# Patient Record
Sex: FEMALE | Race: WHITE | Employment: FULL TIME | URBAN - METROPOLITAN AREA
[De-identification: names, ages, dates, MRNs, and addresses within clinical notes are randomized per-mention and may not be internally consistent; named-entity substitution may affect disease eponyms.]

---

## 2024-11-14 ENCOUNTER — TELEPHONE (OUTPATIENT)
Dept: HEMATOLOGY/ONCOLOGY | Facility: HOSPITAL | Age: 39
End: 2024-11-14

## 2024-11-14 NOTE — TELEPHONE ENCOUNTER
Received phone call from patient saying that her name is now Negra Sweeney, this is same , address and phone number.  Will update chart.    Stated that she was recently diagnosed with DCIS at [facility] and would like to schedule with a breast surgeon at [place].  Provided both doctor names.  Will need breast imaging and pathology report before able to schedule.  Will request this from [facility].    Briefly discussed different types of surgery and options with plastic surgery, RT, medical oncology if needed. Stated her case will be discussed in our multi disciplinary conference once she is scheduled. Will have  call patient to update chart, and will call her back when records are received.  Patient has direct number and will call navigators back with any further questions.

## 2024-11-18 ENCOUNTER — TELEPHONE (OUTPATIENT)
Dept: HEMATOLOGY/ONCOLOGY | Facility: HOSPITAL | Age: 39
End: 2024-11-18

## 2024-11-18 NOTE — TELEPHONE ENCOUNTER
Received images and pathology report from [facility]. Scheduled with patient on Wednesday, 11/20 at 12pm.  Confirmed date, time and location. Patient will set up her MyChart account.  Will follow up with patient after her consultation.

## 2024-11-18 NOTE — CONSULTS
Breast Surgery New Patient Consultation    Negra Sweeney is a 39 year old patient, referred by Dr. Mishra, who presents for evaluation of R breast DCIS.    History of Present Illness:   Ms. Negra Sweeney is a 39 year old woman  who presents for evaluation of R breast DCIS.     She underwent screening imaging that showed R breast calcifications at the 1:00 position. She underwent ultrasound guided biopsy (clip was placed) on 2024. Pathology showed DCIS, grade 3. This was performed at an outside hospital.     She denies any palpable masses, nipple discharge, skin changes, or axillary adenopathy. She does not have breast pain. She does not have significant past history for breast diseases or breast biopsy. She does have family history of breast cancer. Her maternal grandmother possibly had breast cancer. Her maternal aunt had breast cancer in her 60s. The patient does not have a history of genetic testing.          History reviewed. No pertinent past medical history.    History reviewed. No pertinent surgical history.    Gynecological History:  Menarche at age 12 and LMP 23  Pt is a   Pt was 24 years old at time of first pregnancy.    She has cumulative breastfeeding history of 10 months.  Age of Menopause: n/a  Type: n/a  She denies any history of hormone replacement therapy  She has history of oral contraceptive use for 20 years, currently taking.   She denies infertility treatment to achieve pregnancy.    Medications:     Cetirizine HCl (ZYRTEC ALLERGY) 10 MG Oral Cap Take 10 mg by mouth daily.      Norethindrone Acet-Ethinyl Est (JENNI ) 1-20 MG-MCG Oral Tab Take 1 tablet by mouth daily.         Allergies:    Allergies[1]    Family History:   History reviewed. No pertinent family history.    She is not of Ashkenazi Religious ancestry.    Social History:  History   Alcohol use Not on file       History   Smoking Status    Not on file   Smokeless Tobacco    Not on file       Review of  Systems:    Review of Systems   Constitutional:  Negative for activity change, appetite change, chills, fatigue and unexpected weight change.   HENT:  Negative for ear pain, hearing loss, nosebleeds, sore throat, trouble swallowing and voice change.    Eyes:  Negative for pain and visual disturbance.   Respiratory:  Negative for cough, chest tightness and shortness of breath.    Cardiovascular:  Negative for chest pain, palpitations and leg swelling.   Gastrointestinal:  Negative for nausea, vomiting, abdominal pain, diarrhea, constipation and blood in stool.   Endocrine: Negative for cold intolerance and heat intolerance.   Genitourinary:  Negative for dysuria, hematuria and difficulty urinating.   Musculoskeletal:  Negative for back pain, joint swelling, joint pain and neck pain.   Skin:  Negative for color change, rash and wound.   Allergic/Immunologic: Negative for environmental allergies.   Neurological:  Negative for tremors, syncope, facial asymmetry, speech difficulty and weakness.   Hematological:  Negative for adenopathy. Does not bruise/bleed easily.   Psychiatric/Behavioral:  Negative for hallucinations, behavioral problems, confusion, agitation and depressed mood.       Otherwise as per HPI.    Physical Exam:    /75   Pulse 75   Temp 98.1 °F (36.7 °C) (Oral)   Resp 16   Wt 73 kg (161 lb)   SpO2 98%     Physical Exam  Vitals reviewed.   Constitutional:       Appearance: Normal appearance.   HENT:      Head: Normocephalic and atraumatic.   Eyes:      Extraocular Movements: Extraocular movements intact.      Pupils: Pupils are equal, round, and reactive to light.   Cardiovascular:      Rate and Rhythm: Normal rate.   Pulmonary:      Effort: Pulmonary effort is normal.   Chest:   Breasts:     Right: Normal. No mass, nipple discharge, skin change or tenderness.      Left: Normal. No mass, nipple discharge, skin change or tenderness.       Abdominal:      General: Abdomen is flat.      Palpations:  Abdomen is soft.   Musculoskeletal:         General: Normal range of motion.      Cervical back: Normal range of motion and neck supple.   Lymphadenopathy:      Upper Body:      Right upper body: No supraclavicular or axillary adenopathy.      Left upper body: No supraclavicular or axillary adenopathy.   Skin:     General: Skin is warm and dry.   Neurological:      General: No focal deficit present.      Mental Status: She is alert and oriented to person, place, and time.   Psychiatric:         Mood and Affect: Mood normal.          Bra size: 34D (L)    Labs/imaging: reviewed in EPIC    Impression:   Ms. Negra Sweeney is a 39 year old woman that presents for right breast DCIS (ER/NC+), stage 0.      Discussion and Plan:  I had a discussion with the Patient and her  regarding her breast exam. On exam today I found no evidence of disease besides biopsy site changes. I personally reviewed her recent imaging and pathology and we discussed this.    Before scheduling surgery, we need some more information. In discussion with radiology, I recommend a breast MRI for surgical planning and to evaluate the contralateral breast.  She states genetic testing results would make an impact on whether she would like to have a mastectomy or bilateral mastectomy versus lumpectomy. We will await these results before scheduling.     We discussed treatment of her stage 0 DCIS breast cancer. I explained that this cancer is non-invasive as far as we know at this time. We discussed that lumpectomy with radiation has equal survival to mastectomy.  We discussed that both procedures are outpatient, but lumpectomy has a shorter recovery time. Mastectomy involves drain placement and any reconstruction can lead to more surgical procedures down the line for symmetry/cosmetics.    We discussed that lumpectomy would require radiation that would occur daily for several weeks. We discussed the main side effects being skin reaction and  fatigue, without hair loss, nausea, or vomiting. We discussed that radiation is pretty well tolerated and would start after her surgical healing is underway.     We discussed that mastectomy would not involve radiation. Mastectomy would involve sampling of her underarm lymph nodes (sentinel lymph node biopsy). Mastectomy could be performed with or without reconstruction. If she elected for reconstruction, she would meet with a plastic surgeon for consultation.     We discussed that she would likely not require any chemotherapy, but would be offered systemic anti-estrogen therapy after surgery since her tumor is ER/PA+. We discussed that the final pathology would guide our final treatment plan and that sometimes an invasive focus can be found within DCIS. We discussed the importance of negative margins with lumpectomy and that there is a chance we would have to return to the operating room if not all the DCIS was removed. The risks, benefits, and alternatives were discussed including, but not limited to, seroma development, infection, and bleeding. We also discussed the possibility for upstaging of pathology which would require further surgery on another day.    At this time, the patient would like to proceed with MRI and genetics and then make a decision.     If the patient proceeds with lumpectomy, we discussed that I would require an additional marker (TATYANA ) placement prior to surgery in order to detect the exact location of the tumor.       Additional work up needed:   Genetics: yes, meeting with counselor today (11/20)  MRI: ordered    Surgery:   A) Lymph nodes: sentinel lymph node biopsy if mastectomy  B) Breast: TBD  C) Reconstruction by plastic surgery: TBD    Systemic therapy:  Chemotherapy: To be determined after final pathology  Anti-estrogen therapy: yes    Radiation:   If lumpectomy    She was given ample opportunity for questions and those questions were answered to her satisfaction. She has been  encouraged to contact the office with any questions or concerns. This encounter lasted a total of 55 minutes, more than 50% of which was dedicated to the discussion of management options.     Eron Simmons MD  Breast Surgical Oncology    CC: Dr. Ayesha Mishra            [1] Not on File

## 2024-11-20 NOTE — PROGRESS NOTES
Referring Provider:  Eron Simmons MD    Reason for Referral:  Negra Sweeney was referred for genetic counseling because of a new diagnosis of breast cancer. Ms. Sweeney is a 39 year-old woman of mixed  descent who was diagnosed with ER/ND-positive DCIS of the right breast on 24 following her first screening mammogram. Ms. Sweeney's ovaries are intact. Ms. Sweeney has not had a colonoscopy.    Social History:  Ms. Sweeney was seen today with her , Chase. They live in [place].     Family History:   A three generation pedigree was obtained.      Ms. Sweeney has a 14 year-old daughter.     Ms. Sweeney has one older brother and no sisters.      Ms. Sweeney's mother is 67 years-old and has not had cancer.  Ms. Sweeney's mother had one brother and three sisters. One of Ms. Sweeney's maternal aunts  in her mid-60s from breast cancer. Another maternal aunt  at an unspecified age from bone cancer. Ms. Sweeney's maternal grandmother had breast cancer in her 70s. Ms. Sweeney's maternal grandfather  an at unspecified younger age and had lung cancer.     Ms. Sweeney's father is 67 years-old and has had skin cancer, not otherwise specified. Ms. Sweeney's father has one younger brother and one younger sister. Ms. Sweeney's paternal grandmother is living her in her 80s. Ms. Sweeney's paternal grandfather  in his early 80s and did not have cancer. Ms. Sweeney's grandfather's sister might have had breast cancer.     Please see the pedigree for additional family history information.     Counseling:   The following information was discussed with Ms. Sweeney.    Genetics of Breast Cancer:  In the [place], approximately 1 in 8 women will develop breast cancer. Although the majority of breast cancer cases are sporadic, approximately 5-10% of women with breast cancer have a hereditary cancer syndrome.  Signs of a hereditary cancer syndrome include some rare cancers, common cancers occurring at unusually young ages, multiple  primary cancers in the same individual, or the same type of cancer or related cancers (e.g., breast and ovarian, colorectal and endometrial) in three or more individuals in the same lineage.  Mutations in the genes, BRCA1 and BRCA2, account for the majority of hereditary breast and ovarian cancer families.  Mutations in genes other than BRCA1/2, many of which now have medical management recommendations (e.g., BRODIE, CHEK2, PALB2) are identified in 3-10% of individuals tested using a multigene panel.      Risk Assessment:   Ms. Sweeney meets NCCN Guidelines testing criteria for high-penetrance breast cancer susceptibility genes. Risk assessment to estimate the chance of Ms. Sweeney harboring a BRCA1/2 pathogenic variant was done by using the [place] II Model. Based on this model, Ms. Carranzas risk of carrying a BRCA1/2 pathogenic variant is estimated to be 13%. It is important to note that all prediction models have limitations and medical management should be based on clinical judgment, and personal and family history. In addition, there are no prediction models or testing criteria for moderate penetrance cancer predisposition genes such as BRODIE and CHEK2. I recommend that testing be performed as part of a multigene panel.     Genetic Testing (Panel):  The pros, cons, and limitations of genetic testing were discussed including the potential implications of test results on clinical management.     If a pathogenic variant is not identified (negative result), it is still possible that Ms. Sweeney has a pathogenic variant in one of these genes that was not detected by the genetic test, or that the family is dealing with a hereditary cancer syndrome involving a different gene. It is also possible that Ms. Sweeney's relatives have a pathogenic variant in one of these genes that Ms. Sweeney did not inherit. In this scenario, options for cancer screening/management should be determined according to personal and family histories and should  be discussed with a physician.      A variant of uncertain significance is a DNA change that may or may not alter the function of the gene; therefore, it is usually not possible to determine if the gene variant is responsible for an individual's increased cancer risk.     If Ms. Sweeney is found to carry a pathogenic variant in a cancer predisposition gene, she is at significantly increased risk for various cancers. The magnitude of these risks, and the cancers for which she is at increased risk would depend on the gene involved. Medical recommendations for individuals with BRCA1/2 pathogenic variants were reviewed as an example. It was also explained that for some of the genes for which testing is available, the associated cancer risks have yet to be determined and medical management recommendations may not yet be available for individuals with pathogenic variants in these genes. If she were to test positive for a pathogenic variant, her daughter and siblings would each have a 50% chance of carrying the same variant. At-risk adults (>18) would have the option of pursuing targeted genetic testing to clarify their cancer risks. Genetic test results have implications for the entire biological family. Thus, it is recommended that she share her genetic test results with her biological family members so that they may have their risk assessed.     Genetic Information Non-Discrimination Act:  The legal protections of the Genetic Information Nondiscrimination Act (CYNTHIA) for health insurance and employment were discussed.  CY[place]THIA does not provide protection for life insurance, disability or long-term care insurance.    Summary and Plan:  Ms. Sweeney was referred for genetic counseling because of a new diagnosis of breast cancer at age 39. Her reported maternal family history is somewhat suspicious for a hereditary cancer syndrome. Genetic testing on Ms. Sweeney for high-penetrance breast cancer susceptibility genes is indicated.       At the conclusion of the counseling session Ms. Sweeney decided to proceed with genetic testing. Written consent was obtained. Blood and paperwork were sent to [facility] for their Breast Cancer STAT panel (BRODIE, BRCA1, BRCA2, CDH1, CHEK2, PALB2, PTEN, STK11, and TP53). I anticipate that Ms. Sweeney's results will be available within 6-13 days and will call her with the results.  Results will also be communicated to Dr. Simmons.    Approximately 40 minutes was spent in consultation with Ms. Sweeney.

## 2024-11-20 NOTE — TELEPHONE ENCOUNTER
Called patient and provided the MRI details that I scheduled for Saturday November 30, 2024 at 0900 and to arrive at 0830. Stated patient can eat and drink and to please not wear metal and something comfortable for the test. Instructed her to arrive at the [facility] and to check-in at the outpatient desk. She verbalized understanding of MRI instructions, date, time, and location.

## 2024-11-21 NOTE — TELEPHONE ENCOUNTER
Called patient and stated to her that  would like her to have a sooner breast MRI and I checked with our prior authorization team and I instructed her that her insurance has authorized her MRI and I will try and get a sooner date for her breast MRI. Contacted the patient and offered her an appointment for tomorrow, Friday November 22, 2024 at 0900 and she accepted. Instructed her to arrive 30 minutes prior to the test at the [facility]. Stated she needs to refrain from wearing metal and its okay to eat/drink before. She thanked me for the phone call and assistance.

## 2024-11-22 NOTE — PROGRESS NOTES
Received patient's breast biopsy slides from [facility]. Brought to pathology department.

## 2024-11-22 NOTE — PROGRESS NOTES
Discussed with patient over the phone. Would like to have plastic surgery consultation in order to help with surgical decision. Also waiting for genetic testing results.

## 2024-11-26 NOTE — TELEPHONE ENCOUNTER
Spoke to patient about next steps, she is scheduled to see Dr. Paula on 12/10.  She was emotional about the thought of having a mastectomy.  She is unsure if she would want a lumpectomy or mastectomy.  Did reference the breast cancer treatment handbook and discussed American Cancer Society resources.  Sent her information on how to connect with others to help with her surgical decision.  Briefly reviewed timeline of mastectomy and tissue expander, to fills and exchange on permanent implant.  She will wait for her genetic testing before making a final decision. Provided navigator direct line for her to call back with any questions.

## 2024-12-02 NOTE — PROGRESS NOTES
Referring Provider:                    Eron Simmons MD     Reason for Referral:  Negra Sweeney had genetic testing performed on 11/20/24 because of a new diagnosis of DCIS at age 39.     Genetic Testing Result:  NEGATIVE - No known pathogenic variants were found in the following 9 genes: BRODIE, BRCA1, BRCA2, CDH1, CHEK2, PALB2, PTEN, STK11, and TP53.  Please refer to the report from [facility] ([id number]) for additional testing information. These results were discussed with Ms. Sweeney by phone on 12/02/24.      Summary and Plan:  These results indicate that it is unlikely that Ms. Sweeney has a pathogenic variant in any of the genes listed above. The limitations of the testing include the chance that a pathogenic variant in a gene other than those included in this analysis might be the cause of cancer in Ms. Sweeney or her relatives. Reflex testing is pending.

## 2024-12-10 NOTE — CONSULTS
New Patient Consultation    This is the first visit for this 39 year old female who presents to discuss reconstructive options following mastectomy.    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  The patient ultimately underwent biopsy which revealed DCIS.  She does not have breast pain. She does have family history of breast cancer in her maternal aunt, maternal grandmother, and paternal cousin. She does not have significant past history for breast diseases or breast surgery.  She wears a 34D bra.  The patient was seen by Dr. Simmons and is deciding between breast conservation therapy and bilateral mastectomy.  She was referred here today by Dr. Simmons to discuss options for postmastectomy reconstruction.    Past Medical History:      No past medical history on file.      Past Surgical History:  No past surgical history on file.      Medications:    No outpatient medications have been marked as taking for the 12/10/24 encounter (Appointment) with Yobani Paula MD.         Allergies:    Allergies[1]      Family History:   No family history on file.      Social History:  History   Alcohol use Not on file       History   Smoking Status    Not on file   Smokeless Tobacco    Not on file       History   Drug Use Not on file           Review of Systems:    General:   The patient denies, fever, chills, night sweats, fatigue, generalized weakness, change in appetite, weight loss, or weight gain.    Endocrine:  There is no history of poor/slow wound healing, weight loss/gain, fertility or hormone problems, cold intolerance, heat intolerance, excessive thirst, or thyroid disease.    Allergic/Immunologic:  There is no history of hives, hay fever, angioedema or anaphylaxis.    HEENT:  The patient denies ear pain, ear drainage, hearing loss, change in vision, double vision, cataracts, glaucoma, nasal congestion, nosebleed, hoarseness, sore  throat, or swollen glands.    Respiratory:  The patient denies shortness of breath, cough, bloody cough, phlegm, asthma, or wheezing.    Cardiovascular:   The patient denies chest pain/pressure, palpitations, irregular heartbeat, high blood pressure, stroke, or leg swelling.    Breasts:  The patient denies breast masses, pain, change in the breast skin, skin dimpling, nipple discharge, or rash.    Gastrointestinal:  There is no history of difficulty or pain with swallowing, reflux symptoms, nausea, vomiting, dark/bloody stools, diarrhea, constipation, change in bowel habits, or abdominal pain.    Genitourinary:  The patient denies frequent urination, needing to get up at night to urinate, urinary hesitancy or retaining urine, painful urination, urinary incontinence, decreased urine stream, blood in urine, or vaginal/penile discharge.    Skin:  Then patient denies rash, itching, skin lesions, dry skin, change in skin color or change in moles, sunburn with blistering.    Hematologic/Lymphatic:  The patient denies easily bruising or bleeding, persistent swollen glands or lymph nodes, bleeding disorders, blood clots, or pulmonary embolism.    Gynecologic:  The patient denies irregular menses, pelvic pain, pain with intercourse, painful menses, or pregnancy.    Musculoskeletal:  The patient denies muscle aches/pain, joint pain, stiff joints, neck pain, back pain or bone pain.    Neurologic:  There is no history of migraines or severe headaches, seizure/epilepsy, speech problems, coordination problems, trembling/tremors, fainting/black outs, dizziness, memory problems, loss of sensation/numbness, problems walking, weakness, tingling or burning in hands/feet.     Psychiatric:  There is no history of abusive relationship, bipolar disorder, sleep disturbance, anxiety, depression or feeling of despair.         Physical Exam:  Vitals:    12/10/24 1155   BP: 104/68   Pulse: 67   Resp: 16   Temp: 98.2 °F (36.8 °C)     Body mass  index is 23.88 kg/m².      The patient is awake, alert, and oriented.  She is well-nourished, well-developed woman who appears her stated age. Her speech patterns and movements are normal. Her affect is appropriate.    HEENT: The head is normocephalic. The neck is supple. The thyroid is not enlarged and is without palpable masses.  The trachea is in the midline. Conjunctiva are clear, non-icteric.    Right breast: Grade 3 ptosis is noted.  Striae are absent. Measurements: sternal notch to nipple 27cm, nipple to inframammary fold 7cm, base diameter 13cm.  The skin, nipple, and areola appear normal.  There is no nipple retraction or discharge.  There are no dominant masses in the breast.     Left breast:  Grade 3 ptosis is noted.  Striae are absent.  Measurements:  sternal notch to nipple 27cm, nipple to inframammary fold 7cm, base diameter 13cm.  The skin, nipple, and areola appear normal.  There is no nipple retraction or discharge.  There are no dominant masses in the breast.      Abdomen:  A flat abdomen with limited autologous donor site is noted.  Well-healed periumbilical scar is noted.  There are no palpable hernias noted.    Lymph Nodes:  There is no cervical, supraclavicular, or axillary lymphadenopathy appreciated.    Back: There is no vertebral column tenderness.    Skin: The skin appears normal. There are no suspicious appearing rashes or lesions.    Extremities: The extremities are without deformity, cyanosis or edema.    Impression:   The patient is a 39 year old female who presents with right DCIS considering bilateral ostectomy..     Discussion and Plan:  Prior to discussing reconstructive options, we discussed the differences between skin sparing and nipple sparing approaches.  Based on the patient's breast size and degree of ptosis, the patient is not ideal candidate for a nipple sparing procedure.  As such we focused on a skin sparing mastectomy.    Next, we reviewed the options for post-mastectomy  reconstruction and discussed the risks/benefits of timing (immediate versus delayed) and technique (implant-based versus autologous).  Given the patient's limited autologous donor site, the majority of discussion was focused on implant-based reconstruction.    Specifically, the nature and technique of tissue expander reconstruction was reviewed with the patient. We discussed the prepectoral placement of the tissue expander, use of acellular matrix, and placement of drains.  We discussed that use of acellular dermal matrix in  breast reconstruction is considered an \"off label\" use.  The expansion process, as well as the need for a secondary procedure for placement of a permanent implant, were reviewed.  Representative illustrations and photographs were demonstrated to the patient. The risks of surgery including but not limited to bleeding, infection, scarring, delayed wound healing, seroma, asymmetry, implant infection/extrusion requiring removal, injury to adjacent structures, capsular contracture, ALCL, breast implant associated illness, need for implant exchange, and need for further surgery were reviewed. The expected post-operative course was discussed including the need for activity limitation, drains, and suture removal.  The recommended FDA surveillance protocol for silicone implants was reviewed.  Finally, we reviewed the impact of post-mastectomy radiation therapy on implant-based reconstruction (should it be deemed necessary based on the final pathology results), including increased risk of reconstructive loss and capsular contracture.     Finally, we discussed the possible need for revisions as well as the process of nipple areolar reconstruction.     Multiple questions were answered to the patient and 's satisfaction. No guarantees as to outcome were offered is considering her options and will inform us when she is reached a decision.  A total of 40 minutes was spent reviewing the patient's  history and diagnostic testing, examining the patient, reviewing reconstructive options, and coordinating the patient's care.          [1] Not on File

## 2024-12-10 NOTE — PATIENT INSTRUCTIONS
Surgeon:         Dr. Yobani Paula                                        Tel:         [phone]                                  Fax:        [phone]    Surgery/Procedure: Immediate breast reconstruction with placement of bilateral breast tissue expanders and acellular dermal matrix. 1.5 hours, general anesthesia, outpatient. Joint with Dr. Simmons.  Please request pre-op pec block per anesthesia.     Our office offers a tissue expansion class. If you would like to schedule this prior to your surgical date, please let a team member know.     Dx Code: Ductal carcinoma in situ (DCIS) of right breast     Hospital:  [facility]: [address] [place] [id number]           [phone]    1. Someone will need to drive you to and from the hospital if your procedure is outpatient.    2.Do not drink alcohol or smoke 24 hours prior to your procedure.    3. Bring a picture ID and your insurance card.    4. You will be contacted by the hospital the day before to confirm the procedure time and location.     5. Do not take any herbal supplements or blood thinners at least one week before your procedure/surgery. This includes NSAID's (aspirin, baby aspirin, Motrin, Ibuprofen, Aleve, Advil, Naproxen, etc), Fish oil, vitamin E, turmeric, CoQ10, or green tea supplements, etc. *TYLENOL or acetaminophen is ok to take*    6. PRE-OPERATIVE TESTING: History and physical with medical clearance is REQUIRED within 30 days of the surgery date and is mandatory per Dr. Paula. *If this is not done, your surgery will be postponed*  MEDICAL CLEARANCE WITH YOUR PCP  CBC  CMP  EKG (within 90 days)    7. If you take Coumadin, Plavix, Xarelto, or Eliquis, please contact your prescribing physician for special instructions on how long to hold. If you take insulin, contact your primary care physician for special instructions.     8. Please inform us if you start or change any medications at least one week before surgery (ex: blood  thinners, weight loss medications, diabetic medications, herbal supplements, etc)    9. Does patient have diagnosis of sleep apnea?    [   ] Yes     [  x ]  No    Consent obtained  Photos taken on 12/10/2024

## 2024-12-11 NOTE — TELEPHONE ENCOUNTER
Received call from patient stating she would like to move forward with scheduling bilateral mastectomy with reconstruction. Notified surgery scheduler.  She has her surgery sheet and is looking for dates.  Updated patient of this.  Once she is scheduled for surgery will contact her for the mastectomy class.

## 2024-12-11 NOTE — TELEPHONE ENCOUNTER
Calling pt in regards to scheduling surgery.  Informed pt that I have 01/08/2025 available at [facility] with Dr. Simmons/Dr. Paula.  Pt verbalized understanding and in agreement with date and location.  All questions answered.   Encouraged pt to call or [employer] message office with any other questions or concerns.

## 2024-12-13 NOTE — TELEPHONE ENCOUNTER
Spoke with Negra Sweeney regarding [place] Lymph Node mapping which will be done in nuclear medicine department on 1-07-25, the day before mastectomy surgery scheduled for 1-08-25. Patient aware to arrive 45 minutes before appointment time, park in the [facility], enter through the [place] entrance and proceed to the nuclear medicine department. Procedure explained and all questions answered. Verbal education about lymphedema given. Breast Care Coordinator to provide written information regarding mastectomy surgery, breast cancer and lymphedema. Pt verbalized understanding and had no further questions at this time.

## 2024-12-20 NOTE — TELEPHONE ENCOUNTER
Called patient to advise we never received forms from MD office - per patient she submitted forms to MD office staff and even completed STEPHAN and they advised her they will send forms to our dept- per patient her job told her to do forms as intermittent leave.. For she can be covered for appts  1st FMLA to work continuous   2nd FMLA to drive and do job intermittent ...      Repeated all information to patient and she agreed all information is accurate       Type of Leave: Continuous   Reason for Leave: S/P   Start date of leave: 01/08/2025  End date of leave: 01/15/2025  RTW - [facility] - 01/16/2024   Was Fee and Turnaround info Given?: Yes      Type of Leave: Intermittent   Reason for Leave: Appts/post op appts  Start date of leave: 12/26/2024  End date of leave: 06/26/2025  How many flare ups per month/length?: 1-4 flare per month each lasting 1 day  How many appts per month/length?: 1 appt per week each appt lasting 1-4 hours   Was Fee and Turnaround info Given?: yes

## 2024-12-20 NOTE — TELEPHONE ENCOUNTER
Received e-mail in the forms department. Patient inquiring about Family Medical Leave Act with no attachment intermittent leave and to be completed starting December 26th. Placed in oncology folder. Release of Information was not sent as I don't see information of company.

## 2024-12-20 NOTE — TELEPHONE ENCOUNTER
Reached out to MD office advising patient forms are not in forms dept- requesting for forms to be emailed/faxed to our dept. No response yet- patient is requesting for a call back once we get a word on forms location. Advised patient I will call her back once we locate forms.

## 2024-12-23 NOTE — TELEPHONE ENCOUNTER
Dr. Simmons - Please sign off 2 forms      Please sign off on form if you agree to: FMLA -   Continuous;  Start date of leave: 01/08/2025  End date of leave: 01/15/2025  RTW - WFH - 01/16/2024   Intermittent; Reduced schedule  Start date of leave: 12/26/2024  End date of leave: 06/26/2025  1-4 flare per month each lasting 1 day  1 appt per week each appt lasting 1-4 hours   0-8 hours/day, up to 0-40 hours a week, as needed by patient, remote from home- half days, full days, no work    (place your signature on the first page only)    -From your Inbasket, Highlight the patient and click Chart   -Double click the 12/20/24 Forms Completion telephone encounter  -Scroll down to the Media section   -Click the blue Hyperlink: FMLA 1 Dr Simmons 12/22/24 and FMLA 2 Dr Simmons 12/22/24  -Click Acknowledge located in the top right ribbon/menu   -Drag the mouse into the blank space of the document and a + sign will appear. Left click to   electronically sign the document.     Thank you,     Kristin

## 2024-12-26 NOTE — PROGRESS NOTES
Patient and support person attended pre-operative mastectomy course in the cancer center. Discussed what to expect on day of surgery, PAT phone calls, sentinel lymph node mapping procedure, mastectomy bra and dressings, drain care and measurement on drain log, pain control, medication and constipation prevention, lymphedema awareness, post-op exercises and provided samples of various styles of breast prosthesis. During class was provided ample hands-on time to practice stripping JANICE drain tubing, reviewing drainage cup and reattaching JANICE drain to mastectomy bras.    Patient was provided a bag with heart-shaped splinting pillow, drain lanyard, gauze/kerlix, bio patch, tegaderm, abd pads and drainage cup. Resources provided for [employer] bout[employer], post-operative resources, drain log and mastectomy education. Pathology and follow-up timelines reviewed. Re-enforced teaching regarding emergency care and when to contact surgeon's office.    After class emailed PowerPoint slides, drain care video and supplemental information from the [employer]. Patient was given navigator contact information and encouraged to reach out with any other questions or concerns.

## 2024-12-26 NOTE — PROGRESS NOTES
Patient presented to the office for nurse visit for Pre-Op Tissue Expander education session prior to their upcoming surgery. Patient was accompanied by her , Rai.   Tissue Expander presentation was shown. Ample time was spent with patient discussing the nature of the procedure as well was the expected tissue expansion process, timeframe, and second stage reconstruction options. The patient was also educated on activity restrictions, drain care, post op appts,  and post op medications. Educational illustrations and photographs were reviewed with the patient.   The reasons to call our office with post operative complications was discussed and included, but are not limited to, infection, swelling, drain complications, excessive bruising or bleeding.   Multiple questions were answered to the patient's satisfaction.The patient was provided with the educational brochure as well as post operative supplies.     Silvana MCGOWAN RN

## 2024-12-30 NOTE — TELEPHONE ENCOUNTER
Left message for patient to call back regarding her primary care appointment.  Will also be sending [employer] message.

## 2025-01-08 NOTE — ANESTHESIA PREPROCEDURE EVALUATION
PRE-OP EVALUATION    Patient Name: Negra Sweeney    Admit Diagnosis: Ductal carcinoma in situ (DCIS) of right breast [D05.11]    Pre-op Diagnosis: Ductal carcinoma in situ (DCIS) of right breast [D05.11]    bilateral mastectomy, right breast lymphoscintigraphy, right axillary sentinel lymph node biopsy ([place]) Immediate breast reconstruction with placement of bilateral breast tissue expanders and acellular dermal matrix (Chai)    Anesthesia Procedure: bilateral mastectomy, right breast lymphoscintigraphy, right axillary sentinel lymph node biopsy ([place]us) Immediate breast reconstruction with placement of bilateral breast tissue expanders and acellular dermal matrix (Chai) (Bilateral)  . (Bilateral)    Surgeons and Role:  Panel 1:     * Eron [place] MD - Primary  Panel 2:     * Yobani Paula MD - Primary    Pre-op vitals reviewed.        Body mass index is 22.96 kg/m².    Current medications reviewed.  Hospital Medications:   [START ON 1/8/2025] gentamicin (Garamycin) 80 mg in sodium chloride 0.9% 1,000 mL irrigation   Irrigation Once (Intra-Op)       Outpatient Medications:   Prescriptions Prior to Admission[1]    Allergies: Amoxicillin and Nickel      Anesthesia Evaluation    Patient summary reviewed.    Anesthetic Complications  (-) history of anesthetic complications         GI/Hepatic/Renal                                 Cardiovascular                                                       Endo/Other                                  Pulmonary                           Neuro/Psych                                      Past Surgical History:   Procedure Laterality Date    Arthrotomy,open repair meniscus  2005    Hernia repair  2018    With mesh     Social History     Socioeconomic History    Marital status:    Tobacco Use    Smoking status: Never    Smokeless tobacco: Never   Vaping Use    Vaping status: Never Used   Substance and Sexual Activity    Alcohol use: Yes     Comment: 1-2    Drug use:  Yes     Types: Cannabis     Comment: Sometimes     History   Drug Use    Types: Cannabis     Comment: Sometimes     Available pre-op labs reviewed.               Airway      Mallampati: I  Mouth opening: >3 FB  TM distance: > 6 cm  Neck ROM: full Cardiovascular    Cardiovascular exam normal.         Dental    Dentition appears grossly intact         Pulmonary    Pulmonary exam normal.                 Other findings              ASA: 3   Plan: general  NPO status verified and     Post-procedure pain management plan discussed with surgeon and patient.      Plan/risks discussed with: patient                Present on Admission:  **None**             [1]   No medications prior to admission.

## 2025-01-08 NOTE — DISCHARGE INSTRUCTIONS
Plastic Surgery Home Care Instructions      We hope you were pleased with your care at [facility].  We wish you the best outcome and overall experience with your operation.  These instructions will help to minimize pain, optimize healing, and improve the likelihood of a successful result.    What To Expect  There will be some spotting of the incision lines for the next few days.  Your breasts will be swollen and might feel congested for the next 1-2 weeks  Please DO NOT apply heat or ice to the affected area  Temporary areas of numbness are typical in the early weeks following a breast procedure.  Normalization of sensation can typically take up to several months following the operation.    Bandages (Dressing)  Keep dressings clean and dry  Do not remove your bra unless for hygiene purposes - compression is key - especially at night. You can change to a supportive sports bra or camilsole if this provides good compression and you are more comfortable in that rather than the surgical bra. No underwire.   You are to wear your bra 24/7 for 6 weeks post-operatively.   Reinforce the dressing with insertion of additional padding as needed - this is not required - for your comfort only  You can change the drain dressings if you need to (if they get wet). You will be given extra supplies from the hospital.     Drain Care  Proper drain care is an important part of your home care and will help to prevent fluid collection, minimize the risk for infection, and increase the success of your breast reconstruction.  Empty your drains at 8 am and 8 pm each day  DO NOT GET DRAIN SITES WET  Record each drain separately and use the drain sheet given to you to record the drainage. Follow the instructions on the drain sheet.  Wash your hands before and after emptying your drains  Bring drain sheet with you to your first office visit    Bathing/Showers  You will needs to sponge bathe until your drains are removed. You may shower, but  only from the waist down. Before shower, take out all dressings from bra. Reinforce drain sites with additional waterproof dressings if needed. These will be given to you by the hospital. If some water gets underneath the dressing during the shower, just replace with a new dry waterproof dressing.   DO NOT GET DRAIN SITES WET  No baths, swimming, or hot tubs until you receive medical permission    Pain Medication: Norco  Take one or two tablets every six hours as needed for pain.  Do not take narcotics, if you do not have pain.  Keep stools soft.  Take a stool softener (Metamucil, Milk of Magnesia, Colace).  Eating fruit will also help to prevent constipation    Antibiotics: Clindamycin until all drains are removed  Antibiotics will help minimize the risk of a wound infection  Please note the refills on this prescription. If your drains are kept in after you finish the first course of the antibiotic, you need to continue refilling this until your drains are completely removed.   Fill the prescription as directed   Follow the instructions as written on the bottle's label  Call our office if you experience nausea, rash, or other symptoms which might be a possible side effect.    Over-The-Counter Medication  Non-prescription anti-inflammatory medications can also help to ease the pain.  You can take Aleve or ibuprofen starting 72 hours after surgery  Take as directed on the bottle  Drink a full glass of water with the medication    Home Medication  Resume your home medications as instructed  Do not resume herbal medications for two weeks    Diet  Resume your normal diet    Activity  No strenuous activity or heavy lifting (no lifting over 10 pounds)  No activities that involve your pectoralis muscles (no planks, push-ups, etc)  You can go up and down the stairs as tolerated.   You cannot return to work, if your work requires strenuous activity.  You cannot return to physical exercise, sports, or gym workouts until you  are allowed to participate in strenuous activity.    Driving  Do not drive, if you are taking pain medication.    Return to Work or School  You can return to work when you are not taking pain medication, if your work does not involve strenuous activity.  Contact the office, if you need a medical note.     Follow-up Appointment   Our office will call you to set up a time  Call the office for an appointment in two days if you cannot remember the appointment details.    Verify your appointment date, day, time, and location.  At your 1st postoperative office visit:  Your drains will be examined, wounds will be evaluated, healing assessed, and any additional concerns and instructions will be discussed.    Questions or Concerns  Call Dr. Paula's office if you experience sudden swelling of the breast or purple/red/black discoloration of the breast.     Negra   Thank you for coming to [facility] for your operation.  The nurses and the anesthesiologist try very hard to make sure you receive the best care possible.  Your trust in them is greatly appreciated.    Thanks so much,  Dr. Chai Humphrey, FNP-C  Anna Mota, FNP-C      You have been given a prescription for Norco 5/325  Norco was Given to you at:   2 tablets at 1:45 pm  Next dose due:    Take this medication as directed  This medication contains Tylenol (acetaminophen)  Do not take additional Tylenol while taking Norco    Norco is a Narcotic and can be constipating or upset your stomach  Don't take Norco on an empty stomach  Drink plenty of water  Alcoholic beverages should be avoided while taking narcotics

## 2025-01-08 NOTE — ANESTHESIA PROCEDURE NOTES
Airway  Date/Time: 1/8/2025 7:39 AM  Urgency: elective    Airway not difficult    General Information and Staff    Patient location during procedure: OR  Anesthesiologist: Bienvenido Medina MD  Resident/CRNA: Juliano Herman CRNA  Performed: CRNA   Performed by: Juliano Herman CRNA  Authorized by: Bienvenido Medina MD      Indications and Patient Condition  Indications for airway management: anesthesia  Spontaneous Ventilation: absent  Sedation level: deep  Preoxygenated: yes  Patient position: sniffing  MILS maintained throughout  Mask difficulty assessment: 1 - vent by mask  Planned trial extubation    Final Airway Details  Final airway type: endotracheal airway      Successful airway: ETT  Cuffed: yes   Successful intubation technique: direct laryngoscopy  Facilitating devices/methods: intubating stylet  Endotracheal tube insertion site: oral  Blade: Nhan  Blade size: #3  ETT size (mm): 7.5    Cormack-Lehane Classification: grade I - full view of glottis  Placement verified by: capnometry   Cuff volume (mL): 6  Measured from: lips  ETT to lips (cm): 22  Number of attempts at approach: 1  Number of other approaches attempted: 0    Additional Comments  DENTITION PER PRE OP

## 2025-01-08 NOTE — OPERATIVE REPORT
[facility]   part of [facility]    Operative Note         Negra Sweeney Location: OR   [facility] [id number] MRN [id number]   Admission Date 1/8/2025 Operation Date 1/8/2025   Attending Physician Eron Simmons MD       Patient Name: Negra Sweeney     Preoperative Diagnosis: Ductal carcinoma in situ (DCIS) of right breast [D05.11]     Postoperative Diagnosis: Ductal carcinoma in situ (DCIS) of right breast [D05.11]     Procedure(s):  bilateral mastectomy, right breast lymphoscintigraphy, right axillary sentinel lymph node biopsy (Lapkus) Immediate breast reconstruction with placement of bilateral breast tissue expanders and acellular dermal matrix (Chai)  .     Primary Surgeon: Eron Simmons MD     Surgical Assistant.: Vidal Lewis  APN: Alaina Humphrey APRN     Anesthesia: General     Specimen:   ID Type Source Tests Collected by Time Destination   1 : right breast, short stitch superior, long stitich axilla, nipple anterior Breast tissue Breast, right SURGICAL PATHOLOGY TISSUE Eron Simmnos MD 1/8/2025  8:55 AM    2 : left breast, short stitch superior, long stitich axilla, nipple anterior Breast tissue Breast, left SURGICAL PATHOLOGY TISSUE Eron Simmons MD 1/8/2025  8:57 AM    3 : right axilary sentienal lymph node #1 Tissue Dexter Lymph node SURGICAL PATHOLOGY TISSUE Eron Simmons MD 1/8/2025  9:00 AM    4 : right axillary sentinel lymph node #2 Tissue Dexter Lymph node SURGICAL PATHOLOGY TISSUE Eron Simmons MD 1/8/2025  9:05 AM    5 : additional right allixary tissue Tissue Axilla right SURGICAL PATHOLOGY TISSUE Eron Simmons MD 1/8/2025  9:09 AM         Estimated Blood Loss: No data recorded     Indications for procedure: Ms. Negra Sweeney is a 39 year old woman  who presents for evaluation of R breast DCIS.      She underwent screening imaging that showed R breast calcifications at the 1:00 position. The calcifications spanned about 2.4 cm. She ultimately underwent ultrasound guided  biopsy (clip was placed) on 11/12/2024. Pathology showed DCIS, grade 3. This was performed at an outside hospital. MRI breast showed enhancement measuring up to 1.9 cm.      She met with genetics and was negative for pathogenic variants. She met with plastics and elected for reconstruction. She is here today for bilateral skin sparing mastectomies and right sentinel lymph node biopsy.       Operative Summary:  The patient was brought to the operating suite and placed in the supine position.  General anesthesia was initiated and bilateral PEC block was performed by anesthesia team. Gamma probe was used to confirm signal in the right axilla. The arms, breasts, and chest were prepped and draped in the usual standard fashion. Diluted methylene blue dye was injected into the right retroareolar breast and massage was performed.       We turned our attention to the right breast. An elliptical incision around the nipple-areolar complex was made with a 10 blade knife. The skin flaps were retracted using skin hooks and perez retractors and skin flaps were raised to the clavicle, sternum, latissimus dorsi, and rectus sheath.  Skin flaps were created meticulously, maintaining flap viability. The breast was bluntly removed from the skin flaps using electrocautery and using retractors we were able to reach the sternum medially and the clavicle superiorly.  We then removed the breast from the chest wall using electrocautery, maintaining hemostasis along the way. The pectoralis muscle was left intact and but the pectoralis fascia was removed with the breast tissue. The breast specimen was marked with a 3.0 silk double long black suture in the axillary tail, double short black suture superiorly, and the nipple was present anteriorly.  Hemostasis was established in the surgical bed.    We turned our attention to the right axilla for sentinel lymph node biopsy. The clavipectoral fascia was divided and the probe inserted to the  axilla. A hot and blue lymph node (sentinel lymph node #1) was identified, dissected carefully, and removed. 1 more lymph node was identified and removed carefully. Some axillary tissue was also sent to pathology. There were no palpable, \"hot\", or \"blue\" lymph nodes remaining in the axilla and the specimens were sent for permanent pathology. Hemostasis was established in the axilla and the surgical bed.    We then turned our attention to the left breast. An elliptical incision around the nipple-areolar complex was made with a 10 blade knife. The skin flaps were retracted using skin hooks and perez retractors and skin flaps were raised to the clavicle, sternum, latissimus dorsi, and rectus sheath.  Skin flaps were created meticulously, maintaining flap viability. The breast was bluntly removed from the skin flaps using electrocautery and using retractors we were able to reach the sternum medially and the clavicle superiorly.  We then removed the breast from the chest wall using electrocautery, maintaining hemostasis along the way. The pectoralis muscle was left intact but the pectoralis fascia was removed with the breast tissue. The breast specimen was marked with a 3.0 silk double long black suture in the axillary tail, double short black suture superiorly, and the nipple was present anteriorly.     Dr. Paula with the plastic service came in to perform the reconstruction and this part of the procedure will be dictated as a separate report. Assistance from a surgical assistant was necessary for optimal visualization during the case.          Implants:   Implant Name Type Inv. Item Serial No.  Lot No. LRB No. Used Action   GRAFT HUM TISS THK2-2.8MM THCK L PERF CNTOUR 10.7 X 21.5 CM PC  CM - SN/A  GRAFT HUM TISS THK2-2.8MM THCK L PERF CNTOUR 10.7 X 21.5 CM PC  CM N/A [employer] WD [id number] Right 1 Implanted   GRAFT HUM TISS THK2-2.8MM THCK L PERF CNTOUR 10.7 X 21.5 CM PC  CM -  SN/A  GRAFT HUM TISS THK2-2.8MM THCK L PERF CNTOUR 10.7 X 21.5 CM PC  CM N/A [employer]  [id number] Right 1 Implanted   GRAFT HUM TISS THK2-2.8MM THCK L PERF CNTOUR 10.7 X 21.5 CM PC  CM - SN/A  GRAFT HUM TISS THK2-2.8MM THCK L PERF CNTOUR 10.7 X 21.5 CM PC  CM N/A [employer]  [id number] Left 1 Implanted   GRAFT HUM TISS THK2-2.8MM THCK L PERF CNTOUR 10.7 X 21.5 CM PC  CM - SN/A  GRAFT HUM TISS THK2-2.8MM THCK L PERF CNTOUR 10.7 X 21.5 CM PC  CM N/A [employer]  [id number] Left 1 Implanted        Drains: per plastic surgery    Condition: stable  Discharge per plastic surgery     [place] Node Biopsy for Breast Cancer - Right  Operation performed with curative intent. Yes   Tracer(s) used to identify sentinel nodes in the upfront surgery (non-neoadjuvant) setting (select all that apply). Dye and Radioactive tracer   Tracer(s) used to identify sentinel nodes in the neoadjuvant setting (select all that apply). N/A   All nodes (colored or non-colored) present at the end of a dye-filled lymphatic channel were removed. Yes   All significantly radioactive nodes were removed. Yes   All palpably suspicious nodes were removed. Yes   Biopsy-proven positive nodes marked with clips prior to chemotherapy were identified and removed. N/A     Eron Simmons MD

## 2025-01-08 NOTE — ANESTHESIA PROCEDURE NOTES
Regional Block    Performed by: Juliano Herman CRNA  Authorized by: Bienvenido Medina MD      General Information and Staff    Start Time:   Anesthesiologist:  Bienvenido Medina MD  CRNA:  Juliano Herman CRNA  Performed by:  CRNA  Patient Location:  OR    Block Placement: Post Induction  Site Identification: real time ultrasound guided and image stored and retrievable    Block site/laterality marked before start: site marked  Reason for Block: at surgeon's request and post-op pain management    Preanesthetic Checklist: 2 patient identifers, IV checked, risks and benefits discussed, monitors and equipment checked, pre-op evaluation, timeout performed, anesthesia consent, sterile technique used, no prohibitive neurological deficits and no local skin infection at insertion site      Procedure Details    Patient Position:  Supine  Prep: ChloraPrep    Monitoring:  Cardiac monitor, continuous pulse ox, blood pressure cuff and heart rate  Anesthesia block type: serratous anterior block.  Laterality:  Bilateral  Injection Technique:  Single-shot    Needle    Needle Type:  Short-bevel and echogenic  Needle Gauge:  21 G  Needle Length:  110 mm  Needle Localization:  Ultrasound guidance  Reason for Ultrasound Use: appropriate spread of the medication was noted in real time and no ultrasound evidence of intravascular and/or intraneural injection            Assessment    Injection Assessment:  Good spread noted, negative resistance, negative aspiration for heme, incremental injection, low pressure and local visualized surrounding nerve on ultrasound  Paresthesia Pain:  None  Heart Rate Change: No    - Patient tolerated block procedure well without evidence of immediate block related complications.     Medications      Additional Comments    Medication:  Bupivacaine 0.25% 30mL & PF Decadron 2mg per side

## 2025-01-08 NOTE — OPERATIVE REPORT
DATE OF SURGERY:01/08/25   PREOPERATIVE DIAGNOSIS: Right breast DCIS with acquired absence of bilateral breasts.  POSTOPERATIVE DIAGNOSIS: Right breast DCIS with acquired absence of bilateral breasts.  PROCEDURE PERFORMED: Immediate bilateral breast reconstruction with tissue expander and acellular dermal matrix.  ASSISTANT: JOSIAH Swanson. Her assitance was required for positioning, prepping, draping, retracting, and suturing.   ANESTHESIA: General with endotracheal intubation.  ESTIMATED BLOOD LOSS: 10ml  DRAINS: Kennedy Becker x 4  SPECIMENS: Bilateral breast skin  COMPLICATIONS: None.     FINDINGS: Bilateral breasts reconstructed with Natrelle 133S smooth tissue expander, 400 mL, reference [id number]. On the right serial #[id number].  On the left, serial #[id number]. The tissue expanders were placed in the prepectoral position with anterior coverage of Alloderm. No intra-operative expansion was performed.     INDICATIONS: The patient is a 39-year-old female with a history of right breast DCIS. The patient was evaluated by Dr. Simmons and elected to undergo bilateral skin-sparing mastectomy. She was referred preoperatively to discuss reconstructive options and opted for immediate reconstruction with tissue expanders and acellular dermal matrix.    PROCEDURE: Informed consent was obtained from the patient. The risks, benefits, and alternatives were reviewed with the patient preoperatively. She expressed understanding and wished to proceed. The patient was marked in the preoperative holding area in the upright position. The midline, inframammary fold, lateral fold of the breasts were marked. Periareolar incisions were marked in conjunction with Dr. Simmons. The patient was then taken to the operating room by Dr. Simmons' team where she underwent bilateral skin-sparing mastectomy.  Once the mastectomies were completed, I entered the room and the plastic surgery portion of the procedure began.    The surgical site was  re-draped sterilely.  The mastectomy skin flaps were examined and appeared of suitable thickness of viability to facilitate immediate reconstruction.  The pockets were irrigated with warm saline irrigation until all particulate fat was evacuated. Hemostasis was then secured with electrocautery.  Next, the pockets were irrigated with Betadine irrigation and then with antibiotic irrigation until clear.      Next gloves were changed and 4 sheets of large contour perforated AlloDerm were brought on the field and prepared in saline. Two sheets of AlloDerm were secured along the long axis the running 2-0 PDS suture.  The tissue expanders were then brought on the field and immediately bathed in  antibiotic irrigation.  They were checked for integrity and noted to be intact.  The AlloDerm was draped over the anterior surface of the tissue expander.  Fenestrations were then created to allow passage of the suture tabs which were secured to the  AlloDerm with interrupted 3-0 Vicryl sutures.  A posterior pursestring suture of 2-0 PDS was then placed.  An identical construct was created for both sides.  The expanders were then accessed and all air was evacuated.      Gloves were then changed and the surgical sites were isolated with Ioban.  The tissue expander/AlloDerm constructs were then delivered via the incisions and sutured to the chest wall with interrupted 2-0 Prolene sutures.  The AlloDerm along the inferior gutter was secured along the inframammary fold with interrupted 2-0 Vicryl sutures.  Superiorly the AlloDerm was secured to the pectoralis muscle with interrupted 2-0 Vicryl sutures.  Laterally, the AlloDerm was secured to the serratus fascia with interrupted 2-0 Vicryl sutures.    The  mastectomy margins were sharply excised and sent to Pathology as bilateral  mastectomy skin.  Two 15-[place] citizen Shant drains per side were exited through lateral stab incisions and sutured to the skin with 3-0 nylon suture.  The skin was  then closed with interrupted 3-0 Vicryl deep dermal suture and running 4-0 Monocryl subcuticular suture.     The drain sites were dressed with BioPatch and Tegaderm. The incisions dressed with Dermabond, steristrips, Fluff gauze, and a surgical bra. The patient was awakened, extubated, and taken to the recovery area in stable condition. There were no operative complications. All needle, sponge, and instrument counts were correct at the end of the procedure.

## 2025-01-08 NOTE — H&P
History and Physical     Negra Sweeney Patient Status:  Hospital Outpatient Surgery    1985 MRN [id number]   Location [facility] PERIOPERATIVE SERVICE Attending Eron Simmons MD   Hosp Day # 0 PCP SY DAVALOS     Chief Complaint: R breast DCIS    Subjective:    Ms. Negra Sweeney is a 39 year old woman  who presents for evaluation of R breast DCIS.      She underwent screening imaging that showed R breast calcifications at the 1:00 position. The calcifications spanned about 2.4 cm. She ultimately underwent ultrasound guided biopsy (clip was placed) on 2024. Pathology showed DCIS, grade 3. This was performed at an outside hospital. MRI breast showed enhancement measuring up to 1.9 cm.      She met with genetics and was negative for pathogenic variants. She met with plastics and elected for reconstruction. She is here today for bilateral skin sparing mastectomies and right sentinel lymph node biopsy.    History/Other:      Past Medical History:  Past Medical History:    Breast cancer (HCC)    patient denies any chemo or radiation at time of screening    Migraines    Visual impairment    contacts and glasses        Past Surgical History:   Past Surgical History:   Procedure Laterality Date    Arthrotomy,open repair meniscus  2005    Hernia repair  2018    With mesh       Social History:  reports that she has never smoked. She has never used smokeless tobacco. She reports current alcohol use. She reports current drug use. Drug: Cannabis.    Family History:   Family History   Problem Relation Age of Onset    Skin cancer Father 67    Other (parkinsons) Father        Allergies: Allergies[1]    Medications:  Medications Ordered Prior to Encounter[2]    Review of Systems:   A comprehensive 14 point review of systems was completed.    Pertinent positives and negatives noted in the HPI.    Objective:     /64 (BP Location: Left arm)   Pulse 53   Temp 97.9 °F (36.6 °C) (Temporal)   Resp 16    Ht 1.778 m (5' 10\")   Wt 75.3 kg (166 lb 0.1 oz)   LMP 11/12/2024 (Approximate)   SpO2 100%   BMI 23.82 kg/m²     General: No acute distress  HEENT: Normocephalic atraumatic. Moist mucous membranes  Neck: Supple  Respiratory: Nonlabored breathing  Cardiovascular: Regular rate and rhythm  Breast: Exam unchanged from previous consultation   Abdomen: Soft, nontender, nondistended  Neurologic: No focal neurological deficits  Musculoskeletal: Moves all extremities  Extremities: No edema or cyanosis  Psychiatric: Appropriate mood and affect  Integument: No rashes or lesions      Results:    Labs:  Selected labs - last 24 hours:  Endo  Lytes  Renal   Glu -  Na - Ca -  BUN -   POC Gluc  -  K - PO4 -  Cr -   A1c -  Cl - Mg -  eGFR -   TSH -  CO2 -         LFT  CBC  Other   AST -  WBC -  PTT - Procal -   ALT -  Hb -  INR - CRP -   APk -  Hct -  Trop - D dim -   T aaron -  PLT -  pBNP -  BNP -  - Ferritin  -   Prot -    CK  - Lactate  -   Alb -    LDL  - COVID  -       Imaging: Imaging data reviewed in Epic.    Assessment & Plan:    Negra Sweeney is a 39 year old female with right breast DCIS    The risks, benefits, and alternatives were discussed including, but not limited to, seroma development, infection, and bleeding. We also discussed the possibility for upstaging of pathology which would require further surgery on another day. We discussed pathology results would be available in 5-7 business days.    Plan: bilateral skin sparing mastectomies and right sentinel lymph node biopsy    rEon Simmons MD  Breast Surgical Oncology               [1]   Allergies  Allergen Reactions    Amoxicillin HIVES    Nickel RASH   [2]   No current facility-administered medications on file prior to encounter.     Current Outpatient Medications on File Prior to Encounter   Medication Sig Dispense Refill    magnesium 250 MG Oral Tab Take 1 tablet (250 mg total) by mouth.      Multiple Vitamin (ONE-DAILY MULTI VITAMINS) Oral Tab Take 1  tablet by mouth daily.

## 2025-01-08 NOTE — ANESTHESIA POSTPROCEDURE EVALUATION
[facility]    Negra Sweeney Patient Status:  Hospital Outpatient Surgery   Age/Gender 39 year old female MRN [id number]   Location [facility] SURGERY Attending Eron Simmons MD   Hosp Day # 0 PCP SY DAVALOS       Anesthesia Post-op Note    bilateral mastectomy, right breast lymphoscintigraphy, right axillary sentinel lymph node biopsy (Lapkus) Immediate breast reconstruction with placement of bilateral breast tissue expanders and acellular dermal matrix (Chai)    Procedure Summary       Date: 01/08/25 Room / Location:  MAIN OR 05 / [facility] MAIN OR    Anesthesia Start: 0734 Anesthesia Stop: 1130    Procedures:       bilateral mastectomy, right breast lymphoscintigraphy, right axillary sentinel lymph node biopsy (Lapkus) Immediate breast reconstruction with placement of bilateral breast tissue expanders and acellular dermal matrix (Chai) (Bilateral: Breast)      . (Bilateral: Breast) Diagnosis:       Ductal carcinoma in situ (DCIS) of right breast      (Ductal carcinoma in situ (DCIS) of right breast [D05.11])    Surgeons: Eron Simmons MD; Yobani Paula MD Anesthesiologist: Bienvenido Medina MD    Anesthesia Type: general ASA Status: 3            Anesthesia Type: general    Vitals Value Taken Time   /63 01/08/25 1128   Temp 99 01/08/25 1130   Pulse 88 01/08/25 1129   Resp 16 01/08/25 1129   SpO2 100 % 01/08/25 1129   Vitals shown include unfiled device data.        Patient Location: PACU    Anesthesia Type: general    Airway Patency: patent and extubated    Postop Pain Control: adequate    Mental Status: preanesthetic baseline    Nausea/Vomiting: none    Cardiopulmonary/Hydration status: stable euvolemic    Complications: no apparent anesthesia related complications    Postop vital signs: stable    Dental Exam: Unchanged from Preop    Patient to be discharged from PACU when criteria met.

## 2025-01-08 NOTE — PROGRESS NOTES
Plan for bilateral mastectomy by Dr. Simmons and immediate reconstruction with tissue expander and acellular dermal matrix reviewed with patient. The risks of surgery including but not limited to bleeding, infection, scarring, delayed wound healing, seroma, asymmetry, implant infection/extrusion requiring removal, expander deflation, injury to adjacent structures, capsular contracture, ALCL, and need for further surgery were reviewed. The expected post-operative course was discussed. Questions were answered to the patient's satisfaction. No guarantees as to outcome were offered. The patient expresses understanding and wishes to proceed.

## 2025-01-09 NOTE — TELEPHONE ENCOUNTER
Called patient, post op day #1. States that overall doing well, pain is well controlled. She has not looked at incisions and no signs of infection. Managing drains well and documenting output on drain log.  Is aware of her surgical follow up appointment.  Will wait to schedule with medical oncology pending her final pathology,  as she was DCIS on biopsy. Phone number provided and encouraged patient to call back with any questions or concerns.

## 2025-01-13 NOTE — PROGRESS NOTES
Breast Surgery Postop Follow up     History of Present Illness:   Negra Sweeney is a 39 year old woman  who presented with R breast DCIS now s/p bilateral skin sparing mastectomy with reconstruction and right axillary sentinel lymph node biopsy on 1/8/2025. Final pathology showed 1.2 cm span of DICS grade 3, negative margins, and 2 lymph nodes negative for metastatic carcinoma. Tumor was ER/PA positive.      Her incisions are healing well, she has been keeping them clean and dry. Her drain outputs have decreased and yesterday most had an output of 10-25 mL, serosanguinous/serous. She is seeing plastic surgery nurse tomorrow. Her pain has been under control. One of the drains is \"pulling\" but otherwise she is feeling well. She denies shortness of breath, nausea/vomiting, constipation/diarrhea.      Past medical history, surgical history, family history, allergies, and social history were updated as applicable in EPIC, otherwise see prior consultation note.    Review of Systems   Constitutional:  Negative for chills and fever.   HENT:  Negative for sore throat and trouble swallowing.    Eyes:  Negative for visual disturbance.   Respiratory:  Negative for cough, chest tightness and shortness of breath.    Cardiovascular:  Negative for chest pain, palpitations and leg swelling.   Gastrointestinal:  Negative for nausea, vomiting, abdominal pain, diarrhea, constipation and blood in stool.   Genitourinary:  Negative for dysuria, hematuria and difficulty urinating.   Skin:  Negative for rash.   Neurological:  Negative for numbness and headaches.        Physical Exam  Vitals reviewed.   Constitutional:       Appearance: Normal appearance.   HENT:      Head: Normocephalic and atraumatic.   Eyes:      Extraocular Movements: Extraocular movements intact.      Pupils: Pupils are equal, round, and reactive to light.   Cardiovascular:      Rate and Rhythm: Normal rate.   Pulmonary:      Effort: Pulmonary effort is normal.    Chest:   Breasts:     Right: Absent. No mass, nipple discharge, skin change or tenderness.      Left: Absent. No mass, nipple discharge, skin change or tenderness.          Comments: Serosanguinous drains  Steristrips clean and dry  Abdominal:      General: Abdomen is flat.      Palpations: Abdomen is soft.   Musculoskeletal:         General: Normal range of motion.      Cervical back: Normal range of motion and neck supple.   Lymphadenopathy:      Upper Body:      Right upper body: No supraclavicular or axillary adenopathy.      Left upper body: No supraclavicular or axillary adenopathy.   Skin:     General: Skin is warm and dry.   Neurological:      General: No focal deficit present.      Mental Status: She is alert and oriented to person, place, and time.   Psychiatric:         Mood and Affect: Mood normal.          Labs/imaging: reviewed in [facility]     Impression:   Negra Sweeney is a 39 year old woman  who presented with R breast DCIS now s/p bilateral skin sparing mastectomy with reconstruction and right axillary sentinel lymph node biopsy on 1/8/2025. Final pathology showed 1.2 cm span of DICS grade 3, negative margins, and 2 lymph nodes negative for metastatic carcinoma. Tumor was ER/GA positive. Stage pTisN0.     Discussion and Plan:     Patient's pathology report was discussed with her and her . Discussed tumor board decisions as well. She is healing well, will now follow with plastics for her wound, drains, and further reconstruction.      Surgical plan: complete, continue to follow with plastics      Medical oncology: no     Radiation oncology: no     Lymphedema: risk reduction discussed, low risk with SLN     Further screening imaging: none    Return to clinic: 6 months     Eron Simmons MD  Breast Surgical Oncology    Copy: Dr. Mishra

## 2025-01-14 NOTE — TELEPHONE ENCOUNTER
Spoke to patient regarding her voicemail left today on 1-14.  Patient reports increased pain in her left breast that started last night.  Patient reports that she was getting into bed and noticed increased pain.  Patient had to take 2 Norco medications to diminish pain.  Patient had previously tried to wean herself off of pain medication.  Patient denies any injury or trauma to the breast.  Patient denies any fever, chills, body aches, erythema to the breasts.  Patient reports that her drains are still working properly.  There is no change in output color or amount in her drains.  Patient states that pain has been better controlled today.  Patient was encouraged to take over the counter medications on a scheduled regimen.  Alternating between ibuprofen and Tylenol.  Patient was agreeable to the plan.  Patient is also following up on 1-17 for her first postoperative visit.  Patient agreeable to the plan.  Patient was encouraged to contact the office with questions or concerns.

## 2025-01-16 NOTE — PROGRESS NOTES
Negra Sweeney is a 39 year old female who presents today for a follow-up after bilateral mastectomy, right breast lymphoscintigraphy, right axillary sentinel lymph node biopsy (Dr. Simmons), immediate bilateral breast reconstruction with tissue expander (Natrelle 133S smooth tissue expander, 400 mL, no intra-operative expansion) and acellular dermal matrix (Dr. Paula) on 1/8/2025.    She denies fever and chills. She denies nausea, vomiting, diarrhea or constipation.   She reports her pain has improved since alternating Tylenol and Advil around the clock. She does report that she has had a hard time sleeping since she does not usually sleep on her back and she wakes in the night with discomfort.    Physical Exam     Breasts: Bilateral breast incisions are clean, dry, and intact. There is no evidence of wound drainage or wound dehiscence. There is no evidence of hematoma or seroma bilaterally. Mastectomy skin is without erythema. Breast JANICE drain sites x4 are clean, dry, and intact. Drain effluent serosanguinous.     There were no vitals filed for this visit.      Assessment and Plan     Negra Sweeney is doing well s/p bilateral mastectomy, right breast lymphoscintigraphy, right axillary sentinel lymph node biopsy (Dr. Simmons), immediate bilateral breast reconstruction with tissue expander (Natrelle 133S smooth tissue expander, 400 mL, no intra-operative expansion) and acellular dermal matrix (Dr. Paula) on 1/8/2025.    Patient presents to the office today for wound check and drain removal. One drain from each breast was removed today due to low volume output. The patient tolerated this poorly. A dressing of Neosporin, 2x2 gauze, and Tegaderm was placed. The remaining bilateral breast drains were left in place today due to high volume output. These drain sites were redressed with a new Biopatch and Tegaderm.    Bilateral breast incisions were redressed with new steri-strips. Additional ABD pads placed in the  patient's bra for her comfort.    Drain care and parameters for removal were reviewed with the patient. She was instructed to contact the office when her remaining drains are ready for removal. Patient was encouraged to continue her current pain management regimen. She is following up with Dr. Paula on 2/4/2025 for wound check. Compression and activity guidelines were reviewed with the patient. She was encouraged to contact the office with any questions or concerns.     Questions were answered. Patient understands.     Silvana MCGOWAN RN  1/16/2025  10:37 AM

## 2025-01-24 NOTE — PROGRESS NOTES
Negra Sweeney is a 39 year old female who presents today for a follow-up after bilateral mastectomy, right breast lymphoscintigraphy, right axillary sentinel lymph node biopsy (Dr. Simmons), immediate bilateral breast reconstruction with tissue expander (Natrelle 133S smooth tissue expander, 400 mL, no intra-operative expansion) and acellular dermal matrix (Dr. Paula) on 1/8/2025. She denies fever and chills. She denies nausea, vomiting, diarrhea or constipation. Her pain is controlled.      Physical Exam     Breasts: Bilateral breast incisions are clean, dry, and intact. There is no wound drainage or wound dehiscence. There is no evidence of hematoma or seroma bilaterally. Mastectomy skin is without erythema, ecchymosis, necrosis. Breast JANICE drain sites x2 are clean, dry, and intact. Drain effluent serosanguinous.     There were no vitals filed for this visit.      Assessment and Plan     Negra Sweeney is doing well s/p bilateral mastectomy, right breast lymphoscintigraphy, right axillary sentinel lymph node biopsy (Dr. Simmons), immediate bilateral breast reconstruction with tissue expander (Natrelle 133S smooth tissue expander, 400 mL, no intra-operative expansion) and acellular dermal matrix (Dr. Paula) on 1/8/2025.     Remaining JANICE drains were removed today due to low output. Neosporin, gauze and tegederm was placed. She tolerated this well. New steri-strips were placed on the incisions. She can discontinue her antibiotic. She will continue compression and activity restrictions. We reviewed reasons to contact our office. She will follow up on 1/30/2025 for recheck and to possibly begin expansion. She will follow up with Dr. Paula on 2/4/2025.    Questions were answered. Patient understands.     LAYO Avila  1/24/2025  1:58 PM

## 2025-01-30 NOTE — PATIENT INSTRUCTIONS
THIS IS NOT A PRE-OP  Surgeon:         Dr. Yobani Paula                                        Tel:         [phone]                                  Fax:        [phone]    Surgery/Procedure: Second stage reconstruction with removal of bilateral breast tissue expanders, placement of permanent implants and autologous fat grafting to the bilateral breasts. 2.5 hours, general anesthesia, outpatient.      Dx Code: Z90.13    Hospital:  [facility]: [address] [place] [address]           [phone]  [facility]: [address], [place] [address]               [phone]    1. Someone will need to drive you to and from the hospital if your procedure is outpatient.    2.Do not drink alcohol or smoke 24 hours prior to your procedure.    3. Bring a picture ID and your insurance card.    4. You will be contacted by the hospital the day before to confirm the procedure time and location.     5. Do not take any herbal supplements or blood thinners at least one week before your procedure/surgery. This includes NSAID's (aspirin, baby aspirin, Motrin, Ibuprofen, Aleve, Advil, Naproxen, etc), Fish oil, vitamin E, turmeric, CoQ10, or green tea supplements, etc. *TYLENOL or acetaminophen is ok to take*    6. PRE-OPERATIVE TESTING: History and physical with medical clearance is REQUIRED within 30 days of the surgery date and is mandatory per Dr. Paula. *If this is not done, your surgery will be postponed*  MEDICAL CLEARANCE WITH DR. Mishra  CBC  CMP  EKG (within 90 days)    7. If you take Coumadin, Plavix, Xarelto, or Eliquis, please contact your prescribing physician for special instructions on how long to hold. If you take insulin, contact your primary care physician for special instructions.     8. Please inform us if you start or change any medications at least one week before surgery (ex: blood thinners, weight loss medications, diabetic medications, herbal supplements, etc)    9. Does  patient have diagnosis of sleep apnea?    [   ] Yes     [   ]  No    Consent obtained  Photos taken on ______

## 2025-01-30 NOTE — PROGRESS NOTES
Negra Sweeney is a 39 year old female who presents today for a follow-up after bilateral mastectomy, right breast lymphoscintigraphy, right axillary sentinel lymph node biopsy (Dr. Simmons), immediate bilateral breast reconstruction with tissue expander (Natrelle 133S smooth tissue expander, 400 mL, no intra-operative expansion) and acellular dermal matrix (Dr. Paula) on 1/8/2025.     She denies fever and chills. She denies nausea, vomiting, diarrhea or constipation. She denies calf pain or shortness of breath. Her pain is controlled. She has only been taking tylenol at bedtime as needed. She presents today for wound check and possible first fill of her bilateral tissue expanders. She is accompanied by her .      Physical Exam     Breasts: Bilateral breast incisions clean, dry, and intact. No evidence of wound drainage or wound dehiscence. No evidence of hematoma. Fluid wave positive R>L. Mastectomy skin without erythema or ecchymosis.    There were no vitals filed for this visit.      Assessment and Plan     Negra Sweeney is doing well after bilateral mastectomy, right breast lymphoscintigraphy, right axillary sentinel lymph node biopsy (Dr. Simmons), immediate bilateral breast reconstruction with tissue expander (Natrelle 133S smooth tissue expander, 400 mL, no intra-operative expansion) and acellular dermal matrix (Dr. Paula) on 1/8/2025.     The bilateral tissue expander ports were identified via a magnet. These areas were cleansed with Betadine solution. A sterile needle butterfly was introduced and 120 ml of saline was inserted on each side. 0 ml of seroma was aspirated from the left, and 20 ml of seroma was aspirated from the right. A dressing of Neosporin and Band aid was placed over the insertion sites. She tolerated this well.  She is now at a bilateral total of 120cc of saline.     We reviewed continued activity restrictions and continued compressions. She is interested in an implant-based  second stage reconstruction. She does not need chemo or radiation treatment. We will start looking for a date for this for her.    She will follow up with Dr. Paula on 2/4/2025 for a post op visit. She was encouraged to contact our office for questions or concerns.    All her questions were addressed today. She verbalized understanding.    25 minutes spent with patient including chart review, patient exam, and counseling patient.    Anna Mota, APRN  1/30/2025  11:50 AM

## 2025-02-04 NOTE — PROGRESS NOTES
Negra Sweeney is a 39 year old female who presents today in follow-up after undergoing bilateral mastectomy and tissue expander reconstruction on 1/8.  She currently is a 13 cm base diameter tissue expander filled to 120 cc.  She denies fever and chills. She denies nausea, vomiting, diarrhea or constipation.       Physical Examination:  Breasts:Bilateral breast incisions are clean dry and intact.  There is no erythema or seroma noted.  Acceptable shape and symmetry is noted.  Bilateral breasts are sterilely expanded with 60 cc of saline to a total of 180 cc.  No seroma fluid was encountered.    Assessment and Plan:  Patient is doing well.  She may slowly increase activity with compression in place.  We discussed range of motion exercises.  The patient will follow-up in 1 week for further expansion.  The plan was reviewed with the patient and questions were answered.

## 2025-02-10 NOTE — PROGRESS NOTES
Negra Sweeney is a 39 year old female who presents today for a follow-up after bilateral mastectomy, right breast lymphoscintigraphy, right axillary sentinel lymph node biopsy (Dr. Simmons), immediate bilateral breast reconstruction with tissue expander (Natrelle 133S smooth tissue expander, 400 mL, no intra-operative expansion) and acellular dermal matrix (Dr. Paula) on 1/8/2025.     She denies fever and chills. She denies nausea, vomiting, diarrhea or constipation.   Her pain is controlled. She presents today for continued fill of her bilateral breast tissue expanders. She is accompanied by her .  She is currently at a bilateral total of 180cc of saline.      Physical Exam     Breasts: Bilateral breast incisions clean, dry, and intact. No evidence of wound drainage or wound dehiscence. No evidence of hematoma or seroma. Mastectomy skin without erythema or ecchymosis. Spitting suture present on central portion of right breast incision.     There were no vitals filed for this visit.      Assessment and Plan     Negra Sweeney is doing well after bilateral mastectomy, right breast lymphoscintigraphy, right axillary sentinel lymph node biopsy (Dr. Simmons), immediate bilateral breast reconstruction with tissue expander (Natrelle 133S smooth tissue expander, 400 mL, no intra-operative expansion) and acellular dermal matrix (Dr. Paula) on 1/8/2025.     Her bilateral breast incisions are clean, dry, and intact. Spitting suture removed from the central portion of her right breast incision. The bilateral tissue expander ports were identified via a magnet. These areas were cleansed with Betadine solution. A sterile needle butterfly was introduced and 60 ml of saline was inserted on each side. 0 ml of seroma was aspirated from the left, and 0 ml of seroma was aspirated from the right. A dressing of Neosporin and Band aid was placed over the insertion sites. She tolerated this well.  She is now at a bilateral  total of 240cc of saline.     We reviewed continued activity restrictions and continued compressions.     She will follow up with Alaina HADLEY in 1 week for continued expansion. She has a scheduled second stage implant based reconstruction on 8/13/25. We will find her a pre op visit with Dr Paula at her next visit. She was encouraged to contact our office for questions or concerns.    All her questions were addressed today. She verbalized understanding.    Anna Mota, SHENG  2/10/2025  12:06 PM

## 2025-02-17 NOTE — PROGRESS NOTES
Negra Sweeney is a 39 year old female who presents today for a follow-up after  bilateral mastectomy, right breast lymphoscintigraphy, right axillary sentinel lymph node biopsy (Dr. Simmons), immediate bilateral breast reconstruction with tissue expander (Natrelle 133S smooth tissue expander, 400 mL, no intra-operative expansion) and acellular dermal matrix (Dr. Paula) on 1/8/2025.   She is now at a bilateral total of 240cc of saline.     She denies fever and chills. She denies nausea, vomiting, diarrhea or constipation.   Her pain is controlled.      Physical Exam     Breasts: Bilateral breast incisions clean, dry, and intact. No evidence of wound drainage or wound dehiscence. No evidence of hematoma or seroma. Mastectomy skin without erythema or ecchymosis.  There is a palpable expander tap on the right breast at the 2 o'clock position.  There is no evidence of a full-thickness wound.    There were no vitals filed for this visit.      Assessment and Plan     Negra Sweeney is doing well s/p right breast lymphoscintigraphy, right axillary sentinel lymph node biopsy (Dr. Simmons), immediate bilateral breast reconstruction with tissue expander (Natrelle 133S smooth tissue expander, 400 mL, no intra-operative expansion) and acellular dermal matrix (Dr. Paula) on 1/8/2025.     As her bilateral breast incisions are clean, dry, and intact, we will continue with the expansion process. The bilateral tissue expander ports were identified via a magnet. These areas were cleansed with Betadine solution. A sterile needle butterfly was introduced and 60 ml of saline was inserted on each side. 0 ml of seroma was aspirated bilaterally. A dressing of Neosporin and Band aid was placed over the insertion sites. She tolerated this well.  She is now at a bilateral total of 300cc of saline.     The patient will follow-up with me in 1 week for continued expansion.  Then following up with Dr. Paula on 7-1 for her preoperative  appointment.  She has a second stage implant based reconstruction date of 8-.  Patient does not need any chemotherapy or radiation.    Questions were answered. Patient understands.     HSENG Swanson  2/17/2025  11:09 AM

## 2025-02-24 NOTE — PROGRESS NOTES
Negra Sweeney is a 39 year old female who presents today for a follow-up after bilateral mastectomy, right breast lymphoscintigraphy, right axillary sentinel lymph node biopsy (Dr. Simmons), immediate bilateral breast reconstruction with tissue expander (Natrelle 133S smooth tissue expander, 400 mL, no intra-operative expansion) and acellular dermal matrix (Dr. Puala) on 1/8/2025  She is now at a bilateral total of 300cc of saline.     She denies fever and chills. She denies nausea, vomiting, diarrhea or constipation.   Her pain is controlled.      Physical Exam     Breasts: Bilateral breast incisions are clean, dry, intact.  There is no evidence of hematoma or seroma bilaterally.  Bilateral mastectomy skin is without erythema.    There were no vitals filed for this visit.      Assessment and Plan     Negra Sweeney is doing well s/p  right breast lymphoscintigraphy, right axillary sentinel lymph node biopsy (Dr. Simmons), immediate bilateral breast reconstruction with tissue expander (Natrelle 133S smooth tissue expander, 400 mL, no intra-operative expansion) and acellular dermal matrix (Dr. Paula) on 1/8/2025.     As her bilateral breast incisions are clean, dry, and intact, we will continue with the expansion process. The bilateral tissue expander ports were identified via a magnet. These areas were cleansed with Betadine solution. A sterile needle butterfly was introduced and 100 ml of saline was inserted on each side. 0 ml of seroma was aspirated bilaterally. A dressing of Neosporin and Band aid was placed over the insertion sites. She tolerated this well.  She is now at a bilateral total of 400cc of saline.    The patient is now done feeling.  The patient will follow-up with Dr. Paula on 7-1 for her preoperative visit.  She has a second stage implant based reconstruction date of 8-13 at [facility].  She was encouraged to contact the office with any other questions or concerns.    Questions were  answered. Patient understands.     Alaina Humphrey, APRN  2/24/2025  12:26 PM

## 2025-04-11 NOTE — PROGRESS NOTES
Negra Sweeney is a 39 year old female who presents today for a follow-up after bilateral mastectomy, right breast lymphoscintigraphy, right axillary sentinel lymph node biopsy (Dr. Simmons), immediate bilateral breast reconstruction with tissue expander (Natrelle 133S smooth tissue expander, 400 mL, no intra-operative expansion) and acellular dermal matrix (Dr. Paula) on 1/8/2025.  She is currently at a bilateral total of 400 mL of saline.    She denies fever and chills. She denies nausea, vomiting, diarrhea or constipation.   Her pain is controlled.  Patient contacted our office on 4-3 reporting that she may be interested in over expanding her tissue expanders.    Physical Exam     Breasts: Bilateral breast incisions are clean, dry, intact.  No evidence of hematoma or seroma bilaterally.  Mastectomy skin is without erythema.    There were no vitals filed for this visit.      Assessment and Plan     Negra Sweeney is doing well s/p bilateral mastectomy, right breast lymphoscintigraphy, right axillary sentinel lymph node biopsy (Dr. Simmons), immediate bilateral breast reconstruction with tissue expander (Natrelle 133S smooth tissue expander, 400 mL, no intra-operative expansion) and acellular dermal matrix (Dr. Paula) on 1/8/2025.    After speaking with the patient today, the patient reported that she would like to hold off on any additional expansion.  Sizing and fat grafting were discussed with the patient.  The patient is comfortable with not having expansion today.  The patient is scheduled for her second stage implant-based reconstruction date of 8-13.  She has a preoperative visit with Dr. Paula on 7-1.  The patient was encouraged to contact the office with any additional questions or concerns.    She remains at a bilateral total of 400 mL of saline.    Questions were answered. Patient understands.     SHENG Swanson  4/11/2025  1:44 PM

## 2025-06-20 NOTE — TELEPHONE ENCOUNTER
Called patient to reschedule surgery with Dr. Paula. Offered patient 7/3/2025 at [facility]. Patient confirmed.  Instructed patient to contact her PCP immediately to get scheduled for a surgical clearance appointment early next week.   Also rescheduled patient's pre-op appointment to 6/24 at 8:15 am with Dr. Paula in [place]. Patient confirmed.

## 2025-06-24 NOTE — PATIENT INSTRUCTIONS
Surgeon:         Dr. Yobani Paula                                        Tel:         [phone]                                  Fax:        [phone]    Surgery/Procedure: Second stage reconstruction with removal of bilateral breast tissue expanders, placement of permanent implants and autologous fat grafting to the bilateral breasts. 2.5 hours, general anesthesia, outpatient.      Dx Code: Z90.13    Hospital:  [facility]: [address] [place] [id number]           [phone]  Surgery Date:  7/3/2025    1. Someone will need to drive you to and from the hospital if your procedure is outpatient.    2.Do not drink alcohol or smoke 24 hours prior to your procedure.    3. Bring a picture ID and your insurance card.    4. You will be contacted by the hospital the day before to confirm the procedure time and location.     5. Do not take any herbal supplements or blood thinners at least one week before your procedure/surgery. This includes NSAID's (aspirin, baby aspirin, Motrin, Ibuprofen, Aleve, Advil, Naproxen, etc), Fish oil, vitamin E, turmeric, CoQ10, or green tea supplements, etc. *TYLENOL or acetaminophen is ok to take*    6. PRE-OPERATIVE TESTING: History and physical with medical clearance is REQUIRED within 30 days of the surgery date and is mandatory per Dr. Paula. *If this is not done, your surgery will be postponed. To avoid delays with your clearance, please ensure your PCP appointment is at least 14 days prior to your surgical date.*  MEDICAL CLEARANCE WITH DR. Mishra  CBC  CMP  EKG (within 90 days)    7. If you take Coumadin, Plavix, Xarelto, or Eliquis, please contact your prescribing physician for special instructions on how long to hold. If you take insulin, contact your primary care physician for special instructions.     8. Please inform us if you start or change any medications at least one week before surgery (ex: blood thinners, weight loss medications, diabetic medications,  herbal supplements, etc)    9. Does patient have diagnosis of sleep apnea?    [   ] Yes     [ X ]  No    Consent obtained  Photos taken on 6/24/2025

## 2025-06-24 NOTE — PROGRESS NOTES
Negra Sweeney is a 39 year old female who presents today for preoperative visit in anticipation of her second stage surgery.  She currently has a 13 centimeter base diameter tissue expander filled to 400 cc.      Physical Examination:  Breasts:Bilateral breast incisions are clean dry and intact.  There is no erythema or seroma noted.  Acceptable shape and symmetry is noted.    Abdomen: A small amount of flank and central abdominal lipodystrophy is noted.  There are no palpable hernias noted.    Assessment and Plan:  We discussed the plan for the second stage which will include removal of bilateral breast tissue expanders, placement of smooth, round, silicone implants, and fat grafting to bilateral reconstructed breasts.  The nature of the procedure was reviewed with the patient.  We discussed the risks of surgery including but not limited to bleeding, infection, scarring, delayed wound healing, asymmetry, implant malposition, implant infection or extrusion requiring removal, ALCL, capsular contracture, hypertrophic scarring or keloid, injury to intra-abdominal structures, contour abnormalities, cysts or calcifications requiring biopsy, and need for further surgery.  We reviewed the expected postoperative course including possible need for drains, as well as need for activity limitation and compression.  Multiple questions were answered the patient's satisfaction.  No guarantees as to outcome were offered.  The patient expresses understanding and wishes to proceed. A total of 20 minutes was spent reviewing the patient's history and diagnostic testing, examining the patient, reviewing reconstructive options, and coordinating the patient's care.

## 2025-07-03 NOTE — DISCHARGE INSTRUCTIONS
Plastic Surgery Home Care Instructions      We hope you were pleased with your care at [facility].  We wish you the best outcome and overall experience with your operation.  These instructions will help to minimize pain, optimize healing, and improve the likelihood of a successful result.    What To Expect  There will be some spotting of the incision lines for the next few days.  Your breasts will be swollen and might feel congested for the next 1-2 weeks  Temporary areas of numbness are typical in the early weeks following a breast procedure.  Normalization of sensation can typically take up to several months following the operation.  Mild bruising, mild swelling and discomfort in the areas of fat grafting is typical.   Please DO NOT apply heat or ice to the affected area    Bandages (Dressing)  Keep dressings clean and dry  Do not remove your bra unless for hygiene purposes - compression is key - especially at night. You can change to a supportive sports bra or camilsole if this provides good compression and you are more comfortable in that rather than the surgical bra. No underwire.   You are to wear your bra 24/7 for 6 weeks post-operatively.   Reinforce the dressing with insertion of additional padding as needed - this is not required - for your comfort only  Leave white steri-strips in place over incisions.  Wear abdominal binder and foam at all times for at least 3 days after surgery. After this, you can change to your own compression garments if they area more comfortable (such as Spanx etc). Wear some sort of abdominal compression at all times for at least 7 days after surgery. After 7 days, abdominal compression is not medically necessary, but compression can continue to help with swelling and prevent contour irregularities when worn for 6 weeks postoperatively.     Bathing/Showers  You can resume showering tomorrow. Let the soap and water run over incisions, do not scrub.   No baths, swimming, or hot  tubs until you receive medical permission    Pain Medication: Norco  Take one or two tablets every six hours as needed for pain.  Do not take narcotics, if you do not have pain. You can take Tylenol if you are not taking Norco. DO NOT take both Tylenol and Norco together as there is already Tylenol in the Norco.  Keep stools soft.  Take a stool softener (Metamucil, Milk of Magnesia, Colace).  Eating fruit will also help to prevent constipation    Antibiotics: Clindamycin  Antibiotics will help minimize the risk of a wound infection  Fill the prescription as directed   Follow the instructions as written on the bottle's label  Call our office if you experience nausea, rash, or other symptoms which might be a possible side effect.    Over-The-Counter Medication  Non-prescription anti-inflammatory medications can also help to ease the pain.  You can take Aleve or ibuprofen starting 48 hours after surgery  Take as directed on the bottle  Drink a full glass of water with the medication    Home Medication  Resume your home medications as instructed  Do not resume herbal medications for two weeks    Diet  Resume your normal diet    Activity  No strenuous activity or heavy lifting (no lifting over 10 pounds)  No activities that involve your pectoralis muscles (no planks, push-ups, etc)  You can go up and down the stairs as tolerated.   You cannot return to work, if your work requires strenuous activity.  You cannot return to physical exercise, sports, or gym workouts until you are allowed to participate in strenuous activity.    Driving  Do not drive, if you are taking pain medication.    Return to Work or School  You can return to work when you are not taking pain medication, if your work does not involve strenuous activity.  Contact the office, if you need a medical note.     Follow-up Appointment   Our office will call you to set up a time    Questions or Concerns  Call Dr. Paula's office if you experience severe pain  not controlled by pain medication, swelling, numbness, tingling, bleeding, fever, or other concerns.   If he is not available, another physician will be able to address your concerns.    Negra     Thank you for coming to [facility] for your operation.  The nurses and the anesthesiologist try very hard to make sure you receive the best care possible.  Your trust in them is greatly appreciated.      Thanks so much,  Dr. Chai Humphrey, FNP-C  Anna Mota, FNP-C

## 2025-07-03 NOTE — ANESTHESIA PROCEDURE NOTES
Airway  Date/Time: 7/3/2025 12:39 PM  Reason: elective    Airway not difficult    General Information and Staff   Patient location during procedure: OR  Anesthesiologist: Arjun Smith MD  Performed: anesthesiologist   Performed by: Arjun Smith MD  Authorized by: Arjun Smith MD        Indications and Patient Condition  Indications for airway management: anesthesia  Sedation level: deep      Preoxygenated: yesPatient position: sniffing    Mask difficulty assessment: 1 - vent by mask    Final Airway Details    Final airway type: endotracheal airway    Successful airway: ETT  Cuffed: yes   Successful intubation technique: direct laryngoscopy  Endotracheal tube insertion site: oral  Blade: Rosado  Blade size: #2  ETT size (mm): 7.0    Placement verified by: capnometry   Cuff volume (mL): 5  Measured from: lips  Number of attempts at approach: 1  Number of other approaches attempted: 0

## 2025-07-03 NOTE — OPERATIVE REPORT
DATE OF SURGERY:07/03/25   PREOPERATIVE DIAGNOSIS: History of bilateral mastectomy and tissue expander reconstruction.  POSTOPERATIVE DIAGNOSIS: History of bilateral mastectomy and tissue expander reconstruction.  PROCEDURE PERFORMED:   Removal of bilateral breast tissue expander  Placement of silicone gel implants, bilateral breasts  Autologous fat grafting to bilateral reconstructed breasts  ASSISTANT: JOSIAH Swanson. Her assitance was required for positioning, prepping, draping, retracting, and suturing.   ANESTHESIA: General with endotracheal intubation.  ESTIMATED BLOOD LOSS: 10ml  DRAINS: None  SPECIMENS: Bilateral breast skin  COMPLICATIONS: None.     FINDINGS: Bilateral breasts reconstructed with Natrelle Inspira Cohesive breast implant, 520 mL, reference SCF-520. On the right serial #[id number].  On the left, serial #[id number].     INDICATIONS: The patient is a 39-year-old female with a history of bilateral mastectomy and tissue expander reconstruction.  The patient completed uncomplicated expansion and now presents for exchange to permanent implants and fat grafting.    PROCEDURE: Informed consent was obtained from the patient. The risks, benefits, and alternatives were reviewed with the patient preoperatively. She expressed understanding and wished to proceed. The patient was marked in the preoperative holding area in the upright position. The midline, inframammary fold, lateral fold of the breasts were marked.  The existing  incisions were encompassed in narrow transverse ellipses.  Areas of contour abnormality daily at the superior medial aspect of the breasts were marked for fat grafting.  Finally, areas of central abdominal and flank lipodystrophy were marked for liposuction.      The patient was then taken to the operating room, properly identified, placed in the supine position.  Sequential compression devices were placed on bilateral lower extremities.  Intravenous antibiotic prophylaxis was  administered.  The patient then underwent successful induction of general anesthesia and endotracheal intubation.  The arms were placed abducted on foam paretic cradles and loosely secured with Kerlix.  The chest and abdomen were then prepped and draped sterilely.    The proposed liposuction port sites in the lateral abdomen were infiltrated with 1% lidocaine with epinephrine. Stab incisions were then created and 1 liter of tumescent solution was infiltrated into the central abdomen and flanks.     Bilateral breast scars were infiltrated with 1% lidocaine with epinephrine.  Attention was turned to the right breast.  The right breast scar was excised with a scalpel and sent for permanent pathologic analysis. A superior subcutaneous flap was then elevated sharply. A transverse capsulotomy was then created, and an intact tissue expander was removed. The pocket was inspected. There was no periprosthetic fluid noted. There were no visible or palpable nodules noted. Complete incorporation of the acellular dermal matrix was noted.      Next, attention was turned to the left breast. The left breast scar was excised and sent for permanent pathologic analysis. A superior subcutaneous flap was then elevated sharply. A transverse capsulotomy was then created, and an intact tissue expander was removed. The pocket was inspected. There was no periprosthetic fluid noted. There were no visible or palpable nodules noted. Complete incorporation of the acellular dermal matrix was noted. Sizers were placed and attention was turned to the abdomen. Using a 4 mm Mercedes tip cannula, power-assisted liposuction of the central abdomen and flanks was performed. Approximately 150 mL of lipoaspirate were collected using the Telller system. The fat was processed in usual fashion. The liposuction port sites were closed with 3-0 Vicryl deep dermal sutures. With sizers in place and the patient in the partial upright position, fat was grafted to  bilateral breasts via the existing incisions. A total of 25mL was grafted to the right breast and 45 mL was grafted to the left breast. Next, the capsule was released superiorly and medially along the base with electrocautery to facilitate expansion of the pocket. Once both pockets had been adequately corrected, sizers of different volumes and projections were placed. Ultimately, it appeared the style  mL implant gave the best size and shape in accordance with the patient's desires. Both pockets were rinsed with Betadine and then antibiotic irrigation until clear. Hemostasis was checked and noted to be adequate. The surgical sites were isolated with Ioban and gloves were changed. The implants were brought on the field and immediately bathed in antibiotic irrigation. They were checked for integrity and noted to be intact. The implants were placed in the prepectoral space taking care to maintain their proper orientation. The capsules were then closed with running 2-0 Vicryl suture. The skin was then closed with 3-0 Vicryl deep dermal suture and 4-0 Monocryl subcuticular suture. Exofin and Steri-Strips were placed on the incisions. Fluff gauze and a surgical bra were placed on the breasts. TopiFoam and an abdominal binder were placed on the abdomen. The patient was awakened, extubated, and taken to the recovery area in stable condition. There were no intraoperative complications. All needle, sponge, and instrument counts were correct at the end of the procedure.

## 2025-07-03 NOTE — ANESTHESIA POSTPROCEDURE EVALUATION
[facility]    Negra Sweeney Patient Status:  Hospital Outpatient Surgery   Age/Gender 39 year old female MRN [id number]   Location [facility] POST ANESTHESIA CARE UNIT Attending Yobani Paula MD   [facility] Day # 0 PCP SY DAVALOS       Anesthesia Post-op Note    Second stage bilateral breast reconstruction with removal of bilateral breast tissue expanders, placement of bilateral permanent implants and autologous fat grafting to the bilateral breasts    Procedure Summary       Date: 07/03/25 Room / Location:  MAIN OR 12 /  MAIN OR    Anesthesia Start: 1230 Anesthesia Stop: 1512    Procedures:       Second stage bilateral breast reconstruction with removal of bilateral breast tissue expanders, placement of bilateral permanent implants and autologous fat grafting to the bilateral breasts (Bilateral: Breast)      FAT GRAFTING (Bilateral) Diagnosis:       Absence of both breasts      (Absence of both breasts [Z90.13])    Surgeons: Yobani Paula MD Anesthesiologist: Simone Méndez DO    Anesthesia Type: general ASA Status: 2            Anesthesia Type: general    Vitals Value Taken Time   /52 07/03/25 15:10   Temp 97 07/03/25 15:13   Pulse 83 07/03/25 15:13   Resp 11 07/03/25 15:13   SpO2 100 % 07/03/25 15:13   Vitals shown include unfiled device data.        Patient Location: PACU    Anesthesia Type: general    Airway Patency: patent    Postop Pain Control: adequate    Mental Status: mildly sedated but able to meaningfully participate in the post-anesthesia evaluation    Nausea/Vomiting: none    Cardiopulmonary/Hydration status: stable euvolemic    Complications: no apparent anesthesia related complications    Postop vital signs: stable    Dental Exam: Unchanged from Preop    Patient to be discharged from PACU when criteria met.

## 2025-07-03 NOTE — ANESTHESIA PREPROCEDURE EVALUATION
PRE-OP EVALUATION    Patient Name: Negra Sweeney    Admit Diagnosis: Absence of both breasts [Z90.13]    Pre-op Diagnosis: Absence of both breasts [Z90.13]    Second stage bilateral breast reconstruction with removal of bilateral breast tissue expanders, placement of bilateral permanent implants and autologous fat grafting to the bilateral breasts    Anesthesia Procedure: Second stage bilateral breast reconstruction with removal of bilateral breast tissue expanders, placement of bilateral permanent implants and autologous fat grafting to the bilateral breasts (Bilateral: Breast)  FAT GRAFTING (Bilateral)    Surgeons and Role:     * Yobani Paula MD - Primary    Pre-op vitals reviewed.  Temp: 98.3 °F (36.8 °C)  Pulse: 67  Resp: 18  BP: 122/78  SpO2: 100 %  Body mass index is 23.85 kg/m².    Current medications reviewed.  Hospital Medications:  Current Medications[1]    Outpatient Medications:   Prescriptions Prior to Admission[2]    Allergies: Amoxicillin and Nickel      Anesthesia Evaluation        Anesthetic Complications           GI/Hepatic/Renal      (-) GERD       (-) chronic renal disease   (-) liver disease                 Cardiovascular                  (-) hypertension     (-) CAD  (-) past MI  (-) CABG/stent  (-) pacemaker/AICD  (-) valvular problems/murmurs     (-) dysrhythmias   (-) CHF  (-) angina              Endo/Other      (-) diabetes                            Pulmonary      (-) asthma  (-) COPD                   Neuro/Psych          (-) CVA   (-) TIA  (-) seizures                       Past Surgical History[3]  Social Hx on file[4]  History   Drug Use    Types: Cannabis     Comment: RARE     Available pre-op labs reviewed.               Airway      Mallampati: II  Mouth opening: 3 FB  TM distance: 4 - 6 cm  Neck ROM: full Cardiovascular    Cardiovascular exam normal.         Dental             Pulmonary    Pulmonary exam normal.                 Other findings              ASA: 2   Plan:  general  NPO status verified and           Plan/risks discussed with: patient                Present on Admission:  **None**             [1]    acetaminophen (Tylenol Extra Strength) tab 1,000 mg  1,000 mg Oral Once    scopolamine (Transderm-Scop) 1 MG/3DAYS patch 1 patch  1 patch Transdermal Once    lactated ringers infusion   Intravenous Continuous    ceFAZolin (Ancef) 2g in 10mL IV syringe premix  2 g Intravenous Once    ceFAZolin (Ancef) 1 g, gentamicin (Garamycin) 80 mg in sodium chloride 0.9% 1,000 mL irrigation   Irrigation Once (Intra-Op)    EPINEPHrine (Adrenalin) 1 mg, lidocaine (Xylocaine) 1 % 50 mL in lactated ringers 1,000 mL tumescent mixture/irrigation   Infiltration Once (Intra-Op)   [2]   Medications Prior to Admission   Medication Sig Dispense Refill Last Dose/Taking    cetirizine 5 MG Oral Tab Take 1 tablet (5 mg total) by mouth daily.   7/2/2025 Bedtime    melatonin 5 MG Oral Cap Take 1 capsule (5 mg total) by mouth nightly.   7/2/2025 Bedtime    magnesium 250 MG Oral Tab Take 1 tablet (250 mg total) by mouth.   7/2/2025 Bedtime    Multiple Vitamin (ONE-DAILY MULTI VITAMINS) Oral Tab Take 1 tablet by mouth in the morning.   7/2/2025 Morning   [3]   Past Surgical History:  Procedure Laterality Date    Arthrotomy,open repair meniscus  2005    Hernia repair  2018    With mesh   [4]   Social History  Socioeconomic History    Marital status:    Tobacco Use    Smoking status: Never    Smokeless tobacco: Never   Vaping Use    Vaping status: Never Used   Substance and Sexual Activity    Alcohol use: Yes     Comment: 1-2    Drug use: Yes     Types: Cannabis     Comment: RARE

## 2025-07-03 NOTE — H&P
No change in ' H&P from 6/24.  We reviewed the plan for the second stage which will include removal of bilateral breast tissue expanders, placement of smooth, round, silicone implants, and fat grafting to bilateral reconstructed breasts.  The nature of the procedure was reviewed with the patient.  We reviewed the risks of surgery including but not limited to bleeding, infection, scarring, delayed wound healing, asymmetry, implant malposition, implant infection or extrusion requiring removal, ALCL, capsular contracture, hypertrophic scarring or keloid, injury to intra-abdominal structures, contour abnormalities, cysts or calcifications requiring biopsy, and need for further surgery.  We reviewed the expected postoperative course including possible need for drains, as well as need for activity limitation and compression.  Multiple questions were answered the patient's satisfaction.  No guarantees as to outcome were offered.  The patient expresses understanding and wishes to proceed.

## 2025-07-07 NOTE — H&P
History of Present Illness:   The patient is a 39 year old female with a history of bilateral mastectomy and tissue expander reconstruction.  The patient has completed uncomplicated expansion and now presents for exchange to permanent implants and fat grafting.    Past Medical History:      Past Medical History[1]      Past Surgical History:  Past Surgical History[2]      Medications:    Medications Ordered Prior to Encounter[3]      Allergies:    Allergies[4]      Family History:   Family History[5]      Social History:  History   Alcohol Use    Yes     Comment: 1-2       History   Smoking Status    Never   Smokeless Tobacco    Never       History   Drug Use    Types: Cannabis     Comment: RARE           Review of Systems:    General:   The patient denies, fever, chills, night sweats, fatigue, generalized weakness, change in appetite, weight loss, or weight gain.    Endocrine:   There is no history of poor/slow wound healing, weight loss/gain, fertility or hormone problems, cold intolerance, heat intolerance, excessive thirst, or thyroid disease.     Allergic/Immunologic:  There is no history of hives, hay fever, angioedema or anaphylaxis.    HEENT:    The patient denies ear pain, ear drainage, hearing loss, change in vision, double vision, cataracts, glaucoma, nasal congestion, nosebleed, hoarseness, sore throat, or swollen glands    Respiratory:   The patient denies shortness of breath, cough, bloody cough, phlegm, asthma, or wheezing    Cardiovascular:  The patient denies chest pain/pressure, palpitations, irregular heartbeat, high blood pressure, stroke, or leg swelling    Breasts:  Patient denies breast masses, pain, change in the breast skin, skin dimpling, nipple discharge, or rash    Gastrointestinal:   There is no history of difficulty or pain with swallowing, reflux symptoms, nausea, vomiting, dark/ bloody stools, diarrhea, constipation,  change in bowel habits, or abdominal pain.     Genitourinary:  The  patient denies frequent urination, needing to get up at night to urinate, urinary hesitancy or retaining urine, painful urination, urinary incontinence, decreased urine stream, blood in the urine, or vaginal/penile discharge.    Skin:   The patient denies rash, itching, skin lesions, dry skin, change in skin color or change in moles, sunburns, or sunburns with blistering.     Hematologic/Lymphatic:  The patient denies easily bruising or bleeding, persistent swollen glands or lymph nodes, bleeding disorders, blood clots, or pulmonary embolism.     Gynecologic:  The patient denies irregular menses, pelvic pain, pain with intercourse, painful menses, or pregnancy    Musculoskeletal:  The patient denies muscle aches/pain, joint pain, stiff joints, neck pain, back pain or bone pain.    Neurologic:  There is no history of migraines or severe headaches, seizure/epilepsy, speech problems, coordination problems, trembling/tremors, fainting/black outs, dizziness, memory problems, loss of sensation/numbness, problems walking, weakness, tingling or burning in hands/feet.     Psychiatric:  There is no history of abusive relationship, bipolar disorder, sleep disturbance, anxiety, depression or feeling of despair.    Physical Exam:    /55   Pulse 57   Temp 98.2 °F (36.8 °C) (Temporal)   Resp 11   Ht 1.778 m (5' 10\")   Wt 75.4 kg (166 lb 3.2 oz)   LMP 06/02/2025 (Approximate)   SpO2 100%   BMI 23.85 kg/m²     The patient is awake, alert, and oriented.  She is a well-nourished, well-developed female who appears her stated age.  Her speech patterns and movements are normal, and affect is appropriate.    HEENT: The head is normocephalic. The neck is supple. The thyroid is not enlarged and is without palpable masses.  The trachea is in the midline. Conjunctiva are clear, non-icteric.    Breasts:Bilateral breast incisions are clean dry and intact.  There is no erythema or seroma noted.  Acceptable shape and symmetry is  noted.     Abdomen: A small amount of flank and central abdominal lipodystrophy is noted.  There are no palpable hernias noted.    Lymph Nodes:  There is no cervical, supraclavicular, or axillary lymphadenopathy appreciated.    Back: There is no vertebral column tenderness.    Skin: The skin appears normal. There are no suspicious appearing rashes or lesions.    Extremities: The extremities are without deformity, cyanosis or edema.    Impression:   The patient is a 39 year old female with a history of bilateral mastectomy and tissue expander reconstruction who now presents for exchange to permanent implants and fat grafting.    Discussion and Plan:  We reviewed the plan for the second stage which will include removal of bilateral breast tissue expanders, placement of smooth, round, silicone implants, and fat grafting to bilateral reconstructed breasts.  The nature of the procedure was reviewed with the patient.  We reviewed the risks of surgery including but not limited to bleeding, infection, scarring, delayed wound healing, asymmetry, implant malposition, implant infection or extrusion requiring removal, ALCL, capsular contracture, hypertrophic scarring or keloid, injury to intra-abdominal structures, contour abnormalities, cysts or calcifications requiring biopsy, and need for further surgery.  We reviewed the expected postoperative course including possible need for drains, as well as need for activity limitation and compression.  Multiple questions were answered the patient's satisfaction.  No guarantees as to outcome were offered.  The patient expresses understanding and wishes to proceed.          [1]   Past Medical History:   Anesthesia complication    hypotension in past    Breast cancer (HCC)    patient denies any chemo or radiation at time of screening    Migraines    Visual impairment    contacts and glasses   [2]   Past Surgical History:  Procedure Laterality Date    Arthrotomy,open repair meniscus   2005    Hernia repair  2018    With mesh   [3]   No current facility-administered medications on file prior to encounter.     Current Outpatient Medications on File Prior to Encounter   Medication Sig Dispense Refill    cetirizine 5 MG Oral Tab Take 1 tablet (5 mg total) by mouth daily.      melatonin 5 MG Oral Cap Take 1 capsule (5 mg total) by mouth nightly.      magnesium 250 MG Oral Tab Take 1 tablet (250 mg total) by mouth.      Multiple Vitamin (ONE-DAILY MULTI VITAMINS) Oral Tab Take 1 tablet by mouth in the morning.     [4]   Allergies  Allergen Reactions    Amoxicillin HIVES    Nickel RASH   [5]   Family History  Problem Relation Age of Onset    Skin cancer Father 67    Other (parkinsons) Father

## 2025-07-14 NOTE — PROGRESS NOTES
Negra Sweeney is a 39 year old female with history of bilateral mastectomy and tissue expander reconstruction who presents today for a follow-up after removal of bilateral breast tissue expander, placement of silicone gel implants (Natrelle Inspira Cohesive breast implant, 520 mL), bilateral breast, autologous fat grafting to bilateral reconstructed breasts on 7/3/25 with Dr. Paula.    She denies fever and chills. She denies nausea, vomiting, diarrhea or constipation.   Her pain is controlled.  She denies any shortness of breath or calf pain.  She presents today for wound check.    Physical Exam     Breasts: Bilateral breast incisions are clean, dry, and intact.  No evidence of wound drainage or dehiscence.  No evidence of hematoma or seroma.  Mastectomy skin is without erythema or ecchymosis.  Bilateral breast implants are with acceptable shape and symmetry.    Abdomen: Fat grafting sites are clean, dry, and intact.  No evidence of wound drainage or dehiscence.  No erythema or ecchymosis throughout the entire abdomen.    There were no vitals filed for this visit.      Assessment and Plan     Negra Sweeney is doing well after removal of bilateral breast tissue expander, placement of silicone gel implants (Natrelle Inspira Cohesive breast implant, 520 mL), bilateral breast, autologous fat grafting to bilateral reconstructed breasts on 7/3/25 with Dr. Paula.    Her bilateral breast incisions are clean, dry, and intact. Steri-Strips were redressed today on her bilateral breast incisions.  She will leave this in place in for another 7 days, then she may remove them gently and leave her incisions open to air. Steri-Strips were removed from her fat grafting sites. We reviewed continued activity restrictions and continued compressions.  She was encouraged to wear light compression on her abdomen for total of 6 weeks postop. She will hold off on scar massage or scar care until cleared by Dr. Paula. We reviewed  reasons to contact our office.    She will follow up with Dr. Paula on 8/15/25 for a post op visit. She was encouraged to contact our office for questions or concerns. All her questions were addressed today. She verbalized understanding.    The 21st Century Cures Act makes medical notes like these available to patients in the interest of transparency. Please be advised this is a medical document. Medical documents are intended to carry relevant information, facts as evident, and the clinical opinion of the practitioner. The medical note is intended as peer to peer communication and may appear blunt or direct. It is written in medical language and may contain abbreviations or verbiage that are unfamiliar.     Anna FAIRBANKS, SHENG  7/14/2025  11:36 AM